# Patient Record
Sex: MALE | Race: WHITE | NOT HISPANIC OR LATINO | Employment: FULL TIME | URBAN - METROPOLITAN AREA
[De-identification: names, ages, dates, MRNs, and addresses within clinical notes are randomized per-mention and may not be internally consistent; named-entity substitution may affect disease eponyms.]

---

## 2017-08-01 ENCOUNTER — ALLSCRIPTS OFFICE VISIT (OUTPATIENT)
Dept: OTHER | Facility: OTHER | Age: 32
End: 2017-08-01

## 2018-01-12 VITALS
OXYGEN SATURATION: 96 % | DIASTOLIC BLOOD PRESSURE: 77 MMHG | RESPIRATION RATE: 16 BRPM | SYSTOLIC BLOOD PRESSURE: 126 MMHG | TEMPERATURE: 97.8 F | HEART RATE: 66 BPM

## 2020-01-09 VITALS
SYSTOLIC BLOOD PRESSURE: 119 MMHG | WEIGHT: 155 LBS | DIASTOLIC BLOOD PRESSURE: 78 MMHG | HEIGHT: 71 IN | BODY MASS INDEX: 21.7 KG/M2 | HEART RATE: 60 BPM

## 2020-01-09 DIAGNOSIS — M77.11 LATERAL EPICONDYLITIS OF RIGHT ELBOW: Primary | ICD-10-CM

## 2020-01-09 DIAGNOSIS — M25.521 PAIN IN RIGHT ELBOW: ICD-10-CM

## 2020-01-09 PROCEDURE — 99203 OFFICE O/P NEW LOW 30 MIN: CPT | Performed by: ORTHOPAEDIC SURGERY

## 2020-01-09 PROCEDURE — 20551 NJX 1 TENDON ORIGIN/INSJ: CPT | Performed by: ORTHOPAEDIC SURGERY

## 2020-01-09 RX ORDER — TRIAMCINOLONE ACETONIDE 40 MG/ML
20 INJECTION, SUSPENSION INTRA-ARTICULAR; INTRAMUSCULAR
Status: COMPLETED | OUTPATIENT
Start: 2020-01-09 | End: 2020-01-09

## 2020-01-09 RX ORDER — LIDOCAINE HYDROCHLORIDE 5 MG/ML
0.5 INJECTION, SOLUTION INFILTRATION; PERINEURAL
Status: COMPLETED | OUTPATIENT
Start: 2020-01-09 | End: 2020-01-09

## 2020-01-09 RX ADMIN — TRIAMCINOLONE ACETONIDE 20 MG: 40 INJECTION, SUSPENSION INTRA-ARTICULAR; INTRAMUSCULAR at 11:34

## 2020-01-09 RX ADMIN — LIDOCAINE HYDROCHLORIDE 0.5 ML: 5 INJECTION, SOLUTION INFILTRATION; PERINEURAL at 11:34

## 2020-01-09 NOTE — PROGRESS NOTES
Assessment/Plan:  1  Lateral epicondylitis of right elbow  Hand/upper extremity injection: R elbow   2  Pain in right elbow  XR elbow 3+ vw right       Scribe Attestation    I,:   Little Lebron MA am acting as a scribe while in the presence of the attending physician :        I,:   Eros Bourne DO personally performed the services described in this documentation    as scribed in my presence :              I discussed with Clairfadi Elza today that his signs and symptoms are consistent with lateral epicondylitis  He is tender to palpation over the lateral epicondyle  He does have pain with resisted wrist extension  Treatment options were discussed in the form of stretching, bracing and a steroid injection  He was agreeable to this  He was provided with a HEP for stretching  He was agreeable to this  He was fitted and provided with a counter force brace  Patient elected to proceed with the steroid injection and this was performed in the office today without any complications  He will follow up in 3 months for repeat evaluation  Subjective:   Avis Clemente is a 30 y o  male who presents to the office today for evaluation of right elbow pain  Patient states this has been ongoing for the past month and a half  He notes pain to the lateral aspect of his right elbow  He states this is increased with lifting  He has been using a compression sleeve and band with mild relief  He denies any known injury  He denies any numbness or tingling  Review of Systems   Constitutional: Negative for chills and fever  HENT: Negative for drooling and sneezing  Eyes: Negative for redness  Respiratory: Negative for cough and wheezing  Gastrointestinal: Negative for nausea and vomiting  Musculoskeletal: Negative for arthralgias, joint swelling and myalgias  Neurological: Negative for weakness and numbness  Psychiatric/Behavioral: Negative for behavioral problems  The patient is not nervous/anxious            History reviewed  No pertinent past medical history  History reviewed  No pertinent surgical history  Family History   Problem Relation Age of Onset    No Known Problems Mother     No Known Problems Father     No Known Problems Sister     No Known Problems Brother     No Known Problems Maternal Aunt     No Known Problems Maternal Uncle     No Known Problems Paternal Aunt     No Known Problems Paternal Uncle     No Known Problems Maternal Grandmother     No Known Problems Maternal Grandfather     No Known Problems Paternal Grandmother     No Known Problems Paternal Grandfather        Social History     Occupational History    Not on file   Tobacco Use    Smoking status: Never Smoker    Smokeless tobacco: Never Used   Substance and Sexual Activity    Alcohol use: Never     Frequency: Never    Drug use: Never    Sexual activity: Not on file       No current outpatient medications on file  No Known Allergies    Objective:  Vitals:    01/09/20 1121   BP: 119/78   Pulse: 60       Ortho Exam     Right elbow    TTP lateral epicondyle  Pain with resisted wrist extension  NTTP triceps  5/5 triceps   - tinel's radial nerve  Compartments soft  Brisk capillary refill  S/m median, radial, and ulnar nerve     Physical Exam   Constitutional: He is oriented to person, place, and time  He appears well-developed and well-nourished  HENT:   Head: Normocephalic and atraumatic  Eyes: Conjunctivae are normal  Right eye exhibits no discharge  Left eye exhibits no discharge  Neck: Normal range of motion  Neck supple  Cardiovascular: Normal rate and intact distal pulses  Pulmonary/Chest: Effort normal  No respiratory distress  Musculoskeletal:   As noted in HPI   Neurological: He is alert and oriented to person, place, and time  Skin: Skin is warm and dry  Psychiatric: He has a normal mood and affect   His behavior is normal  Judgment and thought content normal    Hand/upper extremity injection: R elbow  Date/Time: 1/9/2020 11:34 AM  Consent given by: patient  Site marked: site marked  Timeout: Immediately prior to procedure a time out was called to verify the correct patient, procedure, equipment, support staff and site/side marked as required   Supporting Documentation  Indications: pain   Procedure Details  Condition:lateral epicondylitis Site: R elbow   Preparation: Patient was prepped and draped in the usual sterile fashion  Needle size: 25 G  Ultrasound guidance: no  Medications administered: 0 5 mL lidocaine 0 5 %; 20 mg triamcinolone acetonide 40 mg/mL    Patient tolerance: patient tolerated the procedure well with no immediate complications  Dressing:  Sterile dressing applied

## 2021-06-01 ENCOUNTER — OFFICE VISIT (OUTPATIENT)
Dept: OBGYN CLINIC | Facility: CLINIC | Age: 36
End: 2021-06-01
Payer: COMMERCIAL

## 2021-06-01 VITALS
WEIGHT: 155 LBS | BODY MASS INDEX: 21.7 KG/M2 | SYSTOLIC BLOOD PRESSURE: 112 MMHG | DIASTOLIC BLOOD PRESSURE: 72 MMHG | HEIGHT: 71 IN | HEART RATE: 65 BPM

## 2021-06-01 DIAGNOSIS — M77.11 LATERAL EPICONDYLITIS OF RIGHT ELBOW: Primary | ICD-10-CM

## 2021-06-01 DIAGNOSIS — M25.521 PAIN IN RIGHT ELBOW: ICD-10-CM

## 2021-06-01 PROCEDURE — 99213 OFFICE O/P EST LOW 20 MIN: CPT | Performed by: ORTHOPAEDIC SURGERY

## 2021-06-01 PROCEDURE — 20551 NJX 1 TENDON ORIGIN/INSJ: CPT | Performed by: ORTHOPAEDIC SURGERY

## 2021-06-01 RX ORDER — FEXOFENADINE HYDROCHLORIDE 60 MG/1
60 TABLET, FILM COATED ORAL DAILY
COMMUNITY

## 2021-06-01 RX ORDER — TRIAMCINOLONE ACETONIDE 40 MG/ML
40 INJECTION, SUSPENSION INTRA-ARTICULAR; INTRAMUSCULAR
Status: COMPLETED | OUTPATIENT
Start: 2021-06-01 | End: 2021-06-01

## 2021-06-01 RX ORDER — LIDOCAINE HYDROCHLORIDE 10 MG/ML
1 INJECTION, SOLUTION INFILTRATION; PERINEURAL
Status: COMPLETED | OUTPATIENT
Start: 2021-06-01 | End: 2021-06-01

## 2021-06-01 RX ADMIN — TRIAMCINOLONE ACETONIDE 40 MG: 40 INJECTION, SUSPENSION INTRA-ARTICULAR; INTRAMUSCULAR at 08:10

## 2021-06-01 RX ADMIN — LIDOCAINE HYDROCHLORIDE 1 ML: 10 INJECTION, SOLUTION INFILTRATION; PERINEURAL at 08:10

## 2021-06-01 NOTE — PROGRESS NOTES
Assessment/Plan:  1  Lateral epicondylitis of right elbow     2  Pain in right elbow         Scribe Attestation    I,:  Berkley Mishra am acting as a scribe while in the presence of the attending physician :       I,:  Araceli Crespo DO personally performed the services described in this documentation    as scribed in my presence :         Gabby Lane is a pleasant 37year old who presents to the office today for a follow-up evaluation of his lateral epicondylitis of his right elbow  Since his previous corticosteroid steroid provided him over a year of relief, I offered him a repeat injection at today's visit  We discussed the risks and benefits of corticosteroid injection today in the office and he did elect to proceed  The right elbow injection was administered without issue and well tolerated by the patient  Post injection instructions were provided  He understands can be repeated no sooner than three months  I discussed with the patient that he should continue with the use of the counter-force brace and the physician directed home exercise program  He may use Advil and ice as needed to help alleviate his pain  I will follow-up with him as needed  He understood and had no further questions  Subjective:   Patient ID: Marilyn Gardner is a 37 y o  male who presents to the office today for a follow-up evaluation of his lateral epicondylitis of his right elbow  He received a corticosteroid injection in January 2020 for his lateral epicondylitis and reports it provided him with relief up until February 2021  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 and notes pain over his lateral elbow with any type of pulling  He states that he will experience a sharp shooting pain  He states that he has been complaint with the use of the counter-force brace and the physician directed home exercise program  He denies any numbness or tingling  He would like a repeat corticosteroid injection at today's visit      Review of Systems Constitutional: Negative for chills, fever and unexpected weight change  HENT: Negative for hearing loss, nosebleeds and sore throat  Eyes: Negative for pain, redness and visual disturbance  Respiratory: Negative for cough, shortness of breath and wheezing  Cardiovascular: Negative for chest pain, palpitations and leg swelling  Gastrointestinal: Negative for abdominal pain, nausea and vomiting  Endocrine: Negative for polyphagia and polyuria  Genitourinary: Negative for dysuria and hematuria  Musculoskeletal: Negative for arthralgias, gait problem and joint swelling  Skin: Negative for rash and wound  Neurological: Negative for dizziness, numbness and headaches  Psychiatric/Behavioral: Negative for decreased concentration  The patient is not nervous/anxious  History reviewed  No pertinent past medical history  History reviewed  No pertinent surgical history      Family History   Problem Relation Age of Onset    No Known Problems Mother     No Known Problems Father     No Known Problems Sister     No Known Problems Brother     No Known Problems Maternal Aunt     No Known Problems Maternal Uncle     No Known Problems Paternal Aunt     No Known Problems Paternal Uncle     No Known Problems Maternal Grandmother     No Known Problems Maternal Grandfather     No Known Problems Paternal Grandmother     No Known Problems Paternal Grandfather        Social History     Occupational History    Not on file   Tobacco Use    Smoking status: Never Smoker    Smokeless tobacco: Never Used   Substance and Sexual Activity    Alcohol use: Never     Frequency: Never    Drug use: Never    Sexual activity: Not on file         Current Outpatient Medications:     fexofenadine (ALLEGRA) 60 MG tablet, Take 60 mg by mouth daily, Disp: , Rfl:     No Known Allergies    Objective:  Vitals:    06/01/21 0751   BP: 112/72   Pulse: 65       Ortho Exam   Right Elbow  TTP lateral epicondyle  Pain with resisted wrist extension  Compartments soft  Brisk capillary refill  S/m median, radial, and ulnar nerve     Physical Exam  Constitutional:       Appearance: He is well-developed  HENT:      Head: Normocephalic and atraumatic  Eyes:      General:         Right eye: No discharge  Left eye: No discharge  Conjunctiva/sclera: Conjunctivae normal    Neck:      Musculoskeletal: Normal range of motion and neck supple  Cardiovascular:      Rate and Rhythm: Normal rate  Pulmonary:      Effort: Pulmonary effort is normal  No respiratory distress  Musculoskeletal:      Comments: As noted in HPI   Skin:     General: Skin is warm and dry  Neurological:      Mental Status: He is alert and oriented to person, place, and time  Psychiatric:         Behavior: Behavior normal          Thought Content: Thought content normal          Judgment: Judgment normal        Hand/upper extremity injection: R elbow  Universal Protocol:  Consent: Verbal consent obtained  Risks and benefits: risks, benefits and alternatives were discussed  Consent given by: patient  Site marked: the operative site was marked  Supporting Documentation  Indications: pain   Procedure Details  Condition:lateral epicondylitis Site: R elbow   Needle size: 25 G  Ultrasound guidance: no  Medications administered: 1 mL lidocaine 1 %; 40 mg triamcinolone acetonide 40 mg/mL    Patient tolerance: patient tolerated the procedure well with no immediate complications  Dressing:  Sterile dressing applied           I have personally reviewed pertinent films in PACS and my interpretation is as follows: no new images reviewed today

## 2022-02-22 ENCOUNTER — OFFICE VISIT (OUTPATIENT)
Dept: OBGYN CLINIC | Facility: CLINIC | Age: 37
End: 2022-02-22
Payer: COMMERCIAL

## 2022-02-22 VITALS
DIASTOLIC BLOOD PRESSURE: 82 MMHG | SYSTOLIC BLOOD PRESSURE: 125 MMHG | WEIGHT: 155 LBS | BODY MASS INDEX: 21.7 KG/M2 | HEIGHT: 71 IN | HEART RATE: 73 BPM

## 2022-02-22 DIAGNOSIS — M77.11 LATERAL EPICONDYLITIS OF RIGHT ELBOW: Primary | ICD-10-CM

## 2022-02-22 PROCEDURE — 99213 OFFICE O/P EST LOW 20 MIN: CPT | Performed by: ORTHOPAEDIC SURGERY

## 2022-02-22 PROCEDURE — 20551 NJX 1 TENDON ORIGIN/INSJ: CPT | Performed by: ORTHOPAEDIC SURGERY

## 2022-02-22 RX ORDER — DEXAMETHASONE SODIUM PHOSPHATE 100 MG/10ML
40 INJECTION INTRAMUSCULAR; INTRAVENOUS
Status: COMPLETED | OUTPATIENT
Start: 2022-02-22 | End: 2022-02-22

## 2022-02-22 RX ORDER — LIDOCAINE HYDROCHLORIDE 10 MG/ML
1 INJECTION, SOLUTION INFILTRATION; PERINEURAL
Status: COMPLETED | OUTPATIENT
Start: 2022-02-22 | End: 2022-02-22

## 2022-02-22 RX ADMIN — LIDOCAINE HYDROCHLORIDE 1 ML: 10 INJECTION, SOLUTION INFILTRATION; PERINEURAL at 15:45

## 2022-02-22 RX ADMIN — DEXAMETHASONE SODIUM PHOSPHATE 40 MG: 100 INJECTION INTRAMUSCULAR; INTRAVENOUS at 15:45

## 2022-02-22 NOTE — PROGRESS NOTES
Assessment/Plan:  1  Lateral epicondylitis of right elbow       31year old male with right lateral epicondylitis  Patient is interested in repeat injection today  The right lateral epicondyle was prepped in the usual fashion and steroid injection administered today  Patient tolerated the procedure well  He should continue with the home stretches and use his counterforce brace prn  Follow up PRN  At patient's next appointment, we will get xrays of the right elbow  Subjective:   Allyssa Rodriguez is a 31 y o  male who presents to the office today for follow up of right lateral epicondylitis  Patient has received 2 previous steroid injections for this with the last one being on 06/01/21  He states that this worked well until just about 3 weeks ago  He states the symptoms are similar to what they were prior to his last injection  He denies burning sensation or n/t  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  Review of Systems   All other systems reviewed and are negative  No past medical history on file  No past surgical history on file      Family History   Problem Relation Age of Onset    No Known Problems Mother     No Known Problems Father     No Known Problems Sister     No Known Problems Brother     No Known Problems Maternal Aunt     No Known Problems Maternal Uncle     No Known Problems Paternal Aunt     No Known Problems Paternal Uncle     No Known Problems Maternal Grandmother     No Known Problems Maternal Grandfather     No Known Problems Paternal Grandmother     No Known Problems Paternal Grandfather        Social History     Occupational History    Not on file   Tobacco Use    Smoking status: Never Smoker    Smokeless tobacco: Never Used   Substance and Sexual Activity    Alcohol use: Never    Drug use: Never    Sexual activity: Not on file         Current Outpatient Medications:     fexofenadine (ALLEGRA) 60 MG tablet, Take 60 mg by mouth daily, Disp: , Rfl:     No Known Allergies    Objective:  Vitals:    02/22/22 1506   BP: 125/82   Pulse: 73       Ortho Exam   Right Elbow:  TTP lateral epicondyle  FROM elbow  Pain with active resisted wrist extension  Nontender biceps/triceps, radiocapitellar joint, radial tunnel and medial epicondyle  SILT m/r/u nerve distributions  Brisk capillary refill  Physical Exam  Vitals reviewed  Constitutional:       General: He is not in acute distress  Appearance: Normal appearance  HENT:      Head: Normocephalic and atraumatic  Eyes:      Extraocular Movements: Extraocular movements intact  Conjunctiva/sclera: Conjunctivae normal    Cardiovascular:      Pulses: Normal pulses  Pulmonary:      Effort: Pulmonary effort is normal  No respiratory distress  Abdominal:      Tenderness: There is no guarding  Musculoskeletal:      Cervical back: No rigidity  Skin:     General: Skin is warm and dry  Neurological:      Mental Status: He is alert and oriented to person, place, and time  Psychiatric:         Mood and Affect: Mood normal          Behavior: Behavior normal          Thought Content: Thought content normal          Studies Reviewed:  I have personally reviewed pertinent films in PACS and my interpretation is as follows:  None today  Procedures Performed:  Hand/upper extremity injection: R elbow  Universal Protocol:  Consent: Verbal consent obtained    Risks and benefits: risks, benefits and alternatives were discussed  Consent given by: patient  Patient understanding: patient states understanding of the procedure being performed  Patient identity confirmed: verbally with patient    Supporting Documentation  Indications: pain   Procedure Details  Condition:lateral epicondylitis Site: R elbow   Needle size: 27 G  Ultrasound guidance: no  Approach: lateral  Medications administered: 1 mL lidocaine 1 %; 40 mg dexamethasone 100 mg/10 mL    Patient tolerance: patient tolerated the procedure well with no immediate complications  Dressing:  Sterile dressing applied

## 2023-03-17 ENCOUNTER — APPOINTMENT (OUTPATIENT)
Dept: RADIOLOGY | Facility: CLINIC | Age: 38
End: 2023-03-17

## 2023-03-17 ENCOUNTER — OFFICE VISIT (OUTPATIENT)
Dept: OBGYN CLINIC | Facility: CLINIC | Age: 38
End: 2023-03-17

## 2023-03-17 VITALS
SYSTOLIC BLOOD PRESSURE: 140 MMHG | TEMPERATURE: 98.2 F | BODY MASS INDEX: 24.13 KG/M2 | HEART RATE: 67 BPM | WEIGHT: 173 LBS | DIASTOLIC BLOOD PRESSURE: 70 MMHG

## 2023-03-17 DIAGNOSIS — M89.8X1 PERISCAPULAR PAIN: ICD-10-CM

## 2023-03-17 DIAGNOSIS — M25.511 RIGHT SHOULDER PAIN, UNSPECIFIED CHRONICITY: Primary | ICD-10-CM

## 2023-03-17 DIAGNOSIS — S46.811A TRAPEZIUS STRAIN, RIGHT, INITIAL ENCOUNTER: ICD-10-CM

## 2023-03-17 DIAGNOSIS — M25.511 RIGHT SHOULDER PAIN, UNSPECIFIED CHRONICITY: ICD-10-CM

## 2023-03-17 NOTE — PROGRESS NOTES
Assessment/Plan:  1  Right shoulder pain, unspecified chronicity  XR shoulder 2+ vw right    Ambulatory Referral to Physical Therapy      2  Trapezius strain, right, initial encounter        3  Periscapular pain          The patient does appear to have an upper trapezius and periscapular strain  I am reassured he has excellent strength and range of motion of the shoulder  I do not suspect any cervical radiculopathy at this point as the patient has no pain or numbness rating down the right upper extremity  We will start with physical therapy for this  We did discuss possible MRI in the future if he fails to make progress  He will notify us immediately if he gets any radicular pain or numbness down the right upper extremity  He may ice and take over-the-counter medications as needed for discomfort  He will follow-up in 6 weeks for repeat evaluation  Subjective:   Christina Blue is a 30 y o  male who presents today for evaluation of his right shoulder  He notes he started with pain last Wednesday, with no inciting event prior to the onset of his symptoms  He notes his most about the superior aspect of the shoulder and also occasionally about the posterior aspect in the region of his scapula  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  He notes he has had similar pain to this in the past, however this usually resolves after chiropractic treatment  He did see the chiropractor twice since this started with minimal improvements  He did also see another sports type provider earlier in the week provider earlier in the week Wednesday who had ordered an MRI  Unfortunately his insurance denied this  He is unsure if he ordered a neck or shoulder MRI  He notes his pain does not radiate past the shoulder  His pain seems to be worse with random things like simply sitting in a car for too long  It does not necessarily with movements of the shoulder    He notes good sensation of the right upper extremity, and again denies any pain radiating down the arm  Adali Lim He notes good range of motion of the shoulder neck, and good strength of the right upper extremity  Review of Systems   Constitutional: Negative  Negative for chills and fever  HENT: Negative  Negative for ear pain and sore throat  Eyes: Negative  Negative for pain and redness  Respiratory: Negative  Negative for shortness of breath and wheezing  Cardiovascular: Negative for chest pain and palpitations  Gastrointestinal: Negative  Negative for abdominal pain and blood in stool  Endocrine: Negative  Negative for polydipsia and polyuria  Genitourinary: Negative  Negative for difficulty urinating and dysuria  Musculoskeletal:        As noted in HPI   Skin: Negative  Negative for pallor and rash  Neurological: Negative  Negative for dizziness and numbness  Hematological: Negative  Negative for adenopathy  Does not bruise/bleed easily  Psychiatric/Behavioral: Negative  Negative for confusion and suicidal ideas  History reviewed  No pertinent past medical history  History reviewed  No pertinent surgical history      Family History   Problem Relation Age of Onset   • No Known Problems Mother    • No Known Problems Father    • No Known Problems Sister    • No Known Problems Brother    • No Known Problems Maternal Aunt    • No Known Problems Maternal Uncle    • No Known Problems Paternal Aunt    • No Known Problems Paternal Uncle    • No Known Problems Maternal Grandmother    • No Known Problems Maternal Grandfather    • No Known Problems Paternal Grandmother    • No Known Problems Paternal Grandfather        Social History     Occupational History   • Not on file   Tobacco Use   • Smoking status: Never   • Smokeless tobacco: Never   Substance and Sexual Activity   • Alcohol use: Never   • Drug use: Never   • Sexual activity: Not on file         Current Outpatient Medications:   •  fexofenadine (ALLEGRA) 60 MG tablet, Take 60 mg by mouth daily, Disp: , Rfl:     No Known Allergies    Objective:  Vitals:    03/17/23 1403   BP: 140/70   Pulse: 67   Temp: 98 2 °F (36 8 °C)       Back Exam     Tenderness   Back tenderness location: no cerivcal tenderness  Comments:  Normal neck ROM  Sensation intact bilateral upper extremities  Right Shoulder Exam     Tenderness   Right shoulder tenderness location: mild upper trapezius and periscapular tenderness  Range of Motion   External rotation: 90   Forward flexion: 180   Internal rotation 0 degrees: T10     Muscle Strength   Abduction: 5/5   Internal rotation: 5/5   External rotation: 5/5   Biceps: 5/5     Tests   Nicole test: negative  Impingement: negative  Drop arm: negative    Other   Erythema: absent  Sensation: normal  Pulse: present            Physical Exam  Constitutional:       General: He is not in acute distress  Appearance: He is well-developed  HENT:      Head: Normocephalic and atraumatic  Eyes:      General: No scleral icterus  Conjunctiva/sclera: Conjunctivae normal    Neck:      Vascular: No JVD  Cardiovascular:      Rate and Rhythm: Normal rate  Pulmonary:      Effort: Pulmonary effort is normal  No respiratory distress  Skin:     General: Skin is warm  Neurological:      Mental Status: He is alert and oriented to person, place, and time  Coordination: Coordination normal          I have personally reviewed pertinent films in PACS and my interpretation is as follows:  X-rays right shoulder: No acute osseous abnormality  This document was created using speech voice recognition software  Grammatical errors, random word insertions, pronoun errors, and incomplete sentences are an occasional consequence of this system due to software limitations, ambient noise, and hardware issues     Any formal questions or concerns about content, text, or information contained within the body of this dictation should be directly addressed to the provider for clarification

## 2023-03-21 ENCOUNTER — EVALUATION (OUTPATIENT)
Dept: PHYSICAL THERAPY | Facility: CLINIC | Age: 38
End: 2023-03-21

## 2023-03-21 DIAGNOSIS — M25.511 RIGHT SHOULDER PAIN, UNSPECIFIED CHRONICITY: Primary | ICD-10-CM

## 2023-03-21 NOTE — PROGRESS NOTES
PT Evaluation     Today's date: 3/21/2023  Patient name: Colleen Ravi  : 1985  MRN: [id number]  Referring provider: Daisy Uriostegui  Dx:   Encounter Diagnosis     ICD-10-CM    1  Right shoulder pain, unspecified chronicity  M25 511 Ambulatory Referral to Physical Therapy                     Assessment  Assessment details: Colleen Ravi is a 30 y o  male who presents to physical therapy with pain, decreased UE range of motion, decreased UE strength, decreased cervical range of motion , impaired function, decreased activity tolerance, poor posture and poor body mechanics  Patient's clinical presentation is consistent with their referring diagnosis of Right shoulder pain, unspecified chronicity  (primary encounter diagnosis)  Potential cervical involvement present from special testing  The pt presents with functional limitations of ADLs, recreational activities and work-related activities  Pt would benefit from physical therapy services to address these limitations and maximize function  Pt was instructed and educated on home exercise program today and demonstrates understanding     Impairments: abnormal muscle firing, abnormal muscle tone, abnormal or restricted ROM, abnormal movement, activity intolerance, impaired physical strength, lacks appropriate home exercise program, pain with function, scapular dyskinesis, poor posture  and poor body mechanics    Symptom irritability: moderateUnderstanding of Dx/Px/POC: good   Prognosis: good    Goals  Short Term Goals (4 weeks)  Pt will be independent in basic Home Exercise program   Pt will demonstrate improved postural awareness with no cueing required from PT  Pt will demonstrate improved thoracic and cervical ROM to 100%  Pt will be able to perform ADLs with pain no more than 2/10      Long Term Goals (6-8 weeks)  Pt will be independent in comprehensive HEP  Pt will demonstrate improved FOTO status score by 10 points  Pt will be able to perform work duties with pain no more than 2/10  Pt will demonstrate improved shoulder MMT strength to 5/5            Plan  Patient would benefit from: skilled PT  Referral necessary: No  Planned modality interventions: cryotherapy and thermotherapy: hydrocollator packs  Planned therapy interventions: ADL retraining, body mechanics training, functional ROM exercises, home exercise program, graded exercise, joint mobilization, manual therapy, massage, neuromuscular re-education, patient education, postural training, strengthening, stretching, therapeutic activities, therapeutic exercise and therapeutic training  Frequency: 2x week  Duration in weeks: 6  Treatment plan discussed with: patient        Subjective Evaluation    History of Present Illness  Mechanism of injury: Pt reports right sided upper trap and neck pain that started about 2 weeks ago after helping to wire a house  States he has had several incidences of pain of this nature and has seeked chiropractic care which helped  Reports he did not get significant relief this time around  Had a consult with orthopedics and Xrays of shoulder were negative  He was referred to PT  Pt reports pain is constant in nature at this time  Feels best first thing in the morning and pain progresses with the day  Has been resting as much as possible  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  Denies any headaches or radicular symptoms at this time               Recurrent probem    Pain  Current pain ratin  At best pain ratin  At worst pain ratin  Location: right UT and right sided neck  Quality: burning and pulling  Relieving factors: relaxation  Aggravating factors: overhead activity and lifting  Progression: no change    Social Support    Employment status: working (septic)  Hand dominance: right  Exercise history: none      Diagnostic Tests  X-ray: normal  Treatments  Previous treatment: chiropractic  Patient Goals  Patient goals for therapy: decreased pain, independence with ADLs/IADLs and return to work          Objective     Postural Observations  Seated posture: poor  Standing posture: poor  Correction of posture: makes symptoms better    Additional Postural Observation Details  Forward head, rounded shoulders     Palpation     Right   Hypertonic in the levator scapulae, pectoralis minor, sternocleidomastoid, suboccipitals and upper trapezius  Tenderness of the levator scapulae, pectoralis minor, suboccipitals and upper trapezius  Cervical/Thoracic Screen   Thoracic range of motion within normal limits with the following exceptions: Into extension and rotation limited 50% bilaterally    Neurological Testing     Sensation     Shoulder   Left Shoulder   Intact: light touch    Right Shoulder   Intact: light touch    Active Range of Motion   Cervical/Thoracic Spine       Cervical    Subcranial retraction:   Restriction level: moderate  Extension:  with pain Restriction level: minimal  Left rotation:  with pain Restriction level: minimal  Right rotation:  with pain Restriction level: minimal    Thoracic    Left rotation:  Restriction level: moderate  Right rotation:  Restriction level: moderate  Left Shoulder   Normal active range of motion  Flexion: 180 degrees   Abduction: 170 degrees     Right Shoulder   Normal active range of motion  Flexion: 180 degrees with pain  Abduction: 170 degrees with pain    Scapular Mobility     Right Shoulder   Scapular mobility: poor    Scapular Mobility beyond 90° FF   Scapular elevation: moderate  Upward rotation: inadequate    Joint Play   Left Shoulder  Hypomobile in the thoracic spine  Right Shoulder  Hypomobile in the thoracic spine       Strength/Myotome Testing     Left Shoulder     Planes of Motion   Flexion: 5   Extension: 5   Abduction: 5   External rotation at 0°: 5   Internal rotation at 0°: 5     Right Shoulder     Planes of Motion   Flexion: 4   Extension: 4   Abduction: 4   External rotation at 0°: 4+   Internal rotation at 0°: 5     Additional Strength Details  Pain with resisted right abduction at UT    Tests   Cervical   Positive vertical compression and cervical distraction test     Right Shoulder   Negative drop arm, empty can and Hawkin's  Lumbar   Positive vertical compression   Flowsheet Rows    Flowsheet Row Most Recent Value   PT/OT G-Codes    Current Score 65   Projected Score 79   FOTO information reviewed Yes          Pt was given initial Home Exercise Program today and demonstrates understanding   [employer] access code: [email or web address]       Precautions standard       Manuals 3/21                                       Neuro Re-Ed         Chin tuck x10       scap squeeze x10                                               Ther Ex        TS rotation x10 ea       TS ext        Hor abd        Prone T        Prone Y                                Ther Activity                        Gait Training                        Modalities

## 2023-03-23 ENCOUNTER — OFFICE VISIT (OUTPATIENT)
Dept: PHYSICAL THERAPY | Facility: CLINIC | Age: 38
End: 2023-03-23

## 2023-03-23 DIAGNOSIS — M25.511 RIGHT SHOULDER PAIN, UNSPECIFIED CHRONICITY: Primary | ICD-10-CM

## 2023-03-23 NOTE — PROGRESS NOTES
Daily Note     Today's date: 3/23/2023  Patient name: Rock Ray  : 1985  MRN: [id number]  Referring provider: Chapo Flores  Dx:   Encounter Diagnosis   Name Primary? • Right shoulder pain, unspecified chronicity Yes                  Subjective: Pt reports 3-4/10 pain in right neck and shoulder  Objective: See treatment diary below      Assessment: Pt tolerated treatment well with mininal complaints of pain  Pt with significant TTP at right subocciptals, UT and lev scap  Presents with rounded shoulders and pec tightness  Reviewed initial HEP with patient and demonstrates independence  Plan: Continue with plan of care to decrease pain, improve mobility, strength, and function            [employer] access code: [email or web address]    Precautions standard       Manuals 3/21 3/23      STM  Right subocc, lev scap, UT release      Mobilization   CS distraction                      Neuro Re-Ed         Chin tuck x10 Hold 5, 2x10       scap squeeze x10 Hold 5, 2x10 supine                                              Ther Ex        TS rotation x10 ea Hold 5, 2x10 right      TS ext        Hor abd        Prone T        Prone Y        Corner stretch  Hold 10, 1x10                       Ther Activity                        Gait Training                        Modalities

## 2023-03-28 ENCOUNTER — OFFICE VISIT (OUTPATIENT)
Dept: PHYSICAL THERAPY | Facility: CLINIC | Age: 38
End: 2023-03-28

## 2023-03-28 DIAGNOSIS — M25.511 RIGHT SHOULDER PAIN, UNSPECIFIED CHRONICITY: Primary | ICD-10-CM

## 2023-03-28 NOTE — PROGRESS NOTES
Daily Note     Today's date: 3/28/2023  Patient name: Olive Ellsworth  : 1985  MRN: [id number]  Referring provider: Carlos Fregoso  Dx:   Encounter Diagnosis   Name Primary? • Right shoulder pain, unspecified chronicity Yes                  Subjective: Pt reports improved symptoms after last session  Pain currently rated 3/10  Objective: See treatment diary below      Assessment: Pt tolerated treatment well with minimal complaints of pain  Pt progressing with thoracic rotation mobility  Significant TTP persists at right UT and lev scap  Pt was instructed on self muscular releases using a tennis ball and demonstrates understanding  Plan: Continue with plan of care to decrease pain, improve mobility, strength, and function            [employer] access code: [email or web address]    Precautions standard       Manuals 3/21 3/23 3/28     STM  Right subocc, lev scap, UT release Right subocc, lev scap, UT release     Mobilization   CS distraction CS distraction                     Neuro Re-Ed         Chin tuck x10 Hold 5, 2x10  Hold 5, 2x10      scap squeeze x10 Hold 5, 2x10 supine Hold 5, 2x10 supine                                             Ther Ex        TS rotation x10 ea Hold 5, 2x10 right Hold 5, 2x10 right     TS ext        Hor abd        Prone T        Prone Y        Corner stretch  Hold 10, 1x10  Hold 10, 1x10                      Ther Activity                        Gait Training                        Modalities

## 2023-03-30 ENCOUNTER — OFFICE VISIT (OUTPATIENT)
Dept: PHYSICAL THERAPY | Facility: CLINIC | Age: 38
End: 2023-03-30

## 2023-03-30 DIAGNOSIS — M54.12 CERVICAL RADICULOPATHY: ICD-10-CM

## 2023-03-30 DIAGNOSIS — M25.511 RIGHT SHOULDER PAIN, UNSPECIFIED CHRONICITY: Primary | ICD-10-CM

## 2023-03-30 NOTE — PROGRESS NOTES
Daily Note     Today's date: 3/30/2023  Patient name: Ifeanyi Michael  : 1985  MRN: [id number]  Referring provider: Osmin Humphrey  Dx:   Encounter Diagnosis     ICD-10-CM    1  Right shoulder pain, unspecified chronicity  M25 511                      Subjective: Ifeanyi Michael reports he has not been experiencing as much soreness into shoulder but continues with pain in right side of neck and periscapular region intermittently  Objective: See treatment diary below      Assessment: Tolerated treatment well  Patient responded extremely well to repeated extension based movements with improved cervical rotation, sidebending and extension ROM with pain levels almost abolished by end of session  He demos significant difficulty coordinating rectration but demod improvement when performing against wall with pt OP  He also required cues to achieve more upright posture  Plan: Continue per plan of care        [facility] access code: [email or web address]    Precautions standard       Manuals 3/21 3/23 3/28 3/30    STM  Right subocc, lev scap, UT release Right subocc, lev scap, UT release     Mobilization   CS distraction CS distraction                     Neuro Re-Ed         Chin tuck x10 Hold 5, 2x10  Hold 5, 2x10  10x seated +10pt OP    Chin tuck at wall w/pt OP    3x10 improved CS ROM and decreased pain    scap squeeze x10 Hold 5, 2x10 supine Hold 5, 2x10 supine     Supine retraction w/pt OP    2x10 improved ROM and pain                                    Ther Ex        TS rotation x10 ea Hold 5, 2x10 right Hold 5, 2x10 right     TS ext        Hor abd        Prone T        Prone Y        Corner stretch  Hold 10, 1x10  Hold 10, 1x10      CS retraction w/ extension    3x10 improved ROM and abolished pain

## 2023-04-04 ENCOUNTER — OFFICE VISIT (OUTPATIENT)
Dept: PHYSICAL THERAPY | Facility: CLINIC | Age: 38
End: 2023-04-04

## 2023-04-04 DIAGNOSIS — M54.12 CERVICAL RADICULOPATHY: ICD-10-CM

## 2023-04-04 DIAGNOSIS — M25.511 RIGHT SHOULDER PAIN, UNSPECIFIED CHRONICITY: Primary | ICD-10-CM

## 2023-04-04 NOTE — PROGRESS NOTES
Daily Note     Today's date: 2023  Patient name: Argelia Kapadia  : 1985  MRN: [id number]  Referring provider: Thomas Rothman  Dx:   Encounter Diagnosis     ICD-10-CM    1  Right shoulder pain, unspecified chronicity  M25 511       2  Cervical radiculopathy  M54 12           Start Time: 1315  Stop Time: 1355  Total time in clinic (min): 40 minutes    Subjective: Pt reports that his neck and R shoulder are doing much better since his last treatment session  Pt states that after he does his chin tucks and extensions he regains all of his cervical ROM and his shoulder pain is abolished  No new complaints  Objective: See treatment diary below      Assessment: Tolerated treatment well  Patient demonstrated fatigue post treatment, exhibited good technique with therapeutic exercises and would benefit from continued PT  Pt continues to respond extremely well to the repeated cervical retractions with extension to abolish all pain and normalize his cervical ROM  Continued with that treatment in addition to shoulder/postural strengthening with good head position  Plan: Continue per plan of care  Progress treatment as tolerated         [employer] access code: [email or web address]    Precautions standard       Manuals 3/21 3/23 3/28 3/30 4   STM  Right subocc, lev scap, UT release Right subocc, lev scap, UT release     Mobilization   CS distraction CS distraction                     Neuro Re-Ed         Chin tuck x10 Hold 5, 2x10  Hold 5, 2x10  10x seated +10pt OP 2x10 w/pt OP, better   Chin tuck at wall w/pt OP    3x10 improved CS ROM and decreased pain    scap squeeze x10 Hold 5, 2x10 supine Hold 5, 2x10 supine  Rows blue w/chin tuck 2x10   Supine retraction w/pt OP    2x10 improved ROM and pain    D2 at wall     W/chin tuck 2x10 B                           Ther Ex        TS rotation x10 ea Hold 5, 2x10 right Hold 5, 2x10 right     TS ext        Hor abd        Prone T        Prone Y        Corner stretch Hold 10, 1x10  Hold 10, 1x10      CS retraction w/ extension    3x10 improved ROM and abolished pain 2x10 decreased and abolished

## 2023-04-06 ENCOUNTER — OFFICE VISIT (OUTPATIENT)
Dept: PHYSICAL THERAPY | Facility: CLINIC | Age: 38
End: 2023-04-06

## 2023-04-06 DIAGNOSIS — M54.12 CERVICAL RADICULOPATHY: ICD-10-CM

## 2023-04-06 DIAGNOSIS — M25.511 RIGHT SHOULDER PAIN, UNSPECIFIED CHRONICITY: Primary | ICD-10-CM

## 2023-04-06 NOTE — PROGRESS NOTES
Daily Note     Today's date: 2023  Patient name: Yenni Olivas  : 1985  MRN: [id number]  Referring provider: Arnaud Vera  Dx:   Encounter Diagnosis     ICD-10-CM    1  Right shoulder pain, unspecified chronicity  M25 511       2  Cervical radiculopathy  M54 12           Start Time: 2575  Stop Time: 1615  Total time in clinic (min): 45 minutes    Subjective: Pt reports symptoms have continued to be minimal with c/s retraction and extension  Currently <1 in R post shoulder  Objective: See treatment diary below      Assessment: Pt demonstrates scapular hiking with sh ER when fatigues, verbal cue for rest and posture reset improved form  Pt notes ttp remains along R c/s and post shoulder, pain decreased post treatment session < 5      Plan: Continue per plan of care  Progress treatment as tolerated         [employer] access code: [email or web address]    Precautions standard       Manuals 4/7 3/23 3/28 3/30 4/4   STM Right subocc, lev scap, UT release Right subocc, lev scap, UT release Right subocc, lev scap, UT release     Mobilization  CS distraction CS distraction CS distraction                     Neuro Re-Ed         Chin tuck x10 Hold 5, 2x10  Hold 5, 2x10  10x seated +10pt OP 2x10 w/pt OP, better   Chin tuck at wall w/pt OP    3x10 improved CS ROM and decreased pain    scap squeeze Rows blue w/chin tuck 2x10 Hold 5, 2x10 supine Hold 5, 2x10 supine  Rows blue w/chin tuck 2x10   Supine retraction w/pt OP    2x10 improved ROM and pain    D2 at wall     W/chin tuck 2x10 B                           Ther Ex        TS rotation  Hold 5, 2x10 right Hold 5, 2x10 right     Sh Ext  W/ chin tuck 2x10       Hor abd        Prone T        Prone Y        Corner stretch  Hold 10, 1x10  Hold 10, 1x10      CS retraction w/ extension 2x10   3x10 improved ROM and abolished pain 2x10 decreased and abolished   Shoulder ER Green 2x10 w/ chin tuck

## 2023-04-19 DIAGNOSIS — M25.511 RIGHT SHOULDER PAIN, UNSPECIFIED CHRONICITY: Primary | ICD-10-CM

## 2023-04-24 ENCOUNTER — EVALUATION (OUTPATIENT)
Dept: PHYSICAL THERAPY | Facility: CLINIC | Age: 38
End: 2023-04-24

## 2023-04-24 DIAGNOSIS — M54.12 CERVICAL RADICULOPATHY: ICD-10-CM

## 2023-04-24 DIAGNOSIS — M25.511 RIGHT SHOULDER PAIN, UNSPECIFIED CHRONICITY: Primary | ICD-10-CM

## 2023-04-24 NOTE — PROGRESS NOTES
PT Re-Evaluation     Today's date: 2023  Patient name: Yenni Olivas  : 1985  MRN: [id number]  Referring provider: Arnaud Vera  Dx:   Encounter Diagnosis     ICD-10-CM    1  Right shoulder pain, unspecified chronicity  M25 511       2  Cervical radiculopathy  M54 12           Start Time: 1400  Stop Time: 1428  Total time in clinic (min): 28 minutes    Assessment  Assessment details: Yenni Olivas is a 30 y o  male who presents to physical therapy for his 10th visit for R shoulder pain and cervical radiculopathy  Pt demonstrated decreased pain, increased UE range of motion, increased UE strength, increased cervical range of motion , impaired but improving function, improved activity tolerance, and improved posture and body mechanics  Since starting PT, Radha Montgomery has seen great improvements with his symptoms and feels better performing work duties and ADLs with significantly decreased pain  Radhavivian Montgomery has a better understanding of the source of his issues and does a great job of abolishing his symptoms if he starts to feel it by consistently performing his HEP and improving his posture  Pt feels he has improved enough to continue on his own, pt was educated on what he should focus on and was given an updated HEP  Pt is being discharged at this time     Impairments: abnormal muscle tone    Symptom irritability: moderateUnderstanding of Dx/Px/POC: good   Prognosis: good    Goals  Short Term Goals (4 weeks)  Pt will be independent in basic Home Exercise program - Met  Pt will demonstrate improved postural awareness with no cueing required from PT- Met  Pt will demonstrate improved thoracic and cervical ROM to 100%- Met  Pt will be able to perform ADLs with pain no more than 2/10- Met      Long Term Goals (6-8 weeks)  Pt will be independent in comprehensive HEP- Met  Pt will demonstrate improved FOTO status score by 10 points- Met  Pt will be able to perform work duties with pain no more than 2/10- Met  Pt will demonstrate improved shoulder MMT strength to 5/5- Met            Plan  Plan details: Discharge to updated [facility]  Patient would benefit from: skilled PT  Referral necessary: No  Planned modality interventions: cryotherapy and thermotherapy: hydrocollator packs  Planned therapy interventions: ADL retraining, body mechanics training, functional ROM exercises, home exercise program, graded exercise, joint mobilization, manual therapy, massage, neuromuscular re-education, patient education, postural training, strengthening, stretching, therapeutic activities, therapeutic exercise and therapeutic training  Treatment plan discussed with: patient        Subjective Evaluation    History of Present Illness  Mechanism of injury: 23 Update:  Pt states that he is feeling really good, his ROM has improved significantly  The pain he had in his neck is essentially gone, he might get a tight spot but it goes away after he does his stretches  Pt has not had pain in over a week  Pt is back to doing everything at work without aggravating any of his symptoms  Pt changed what pillows he is using which seems to have helped  Pt is lifting without restrictions  Pt continues to deny headaches, radicular symptoms              Recurrent probem    Pain  Current pain ratin  At best pain ratin  At worst pain ratin  Location: right UT and right scapula  Quality: tight  Relieving factors: relaxation  Progression: improved    Social Support    Employment status: working (septic)  Hand dominance: right  Exercise history: none      Diagnostic Tests  X-ray: normal  Treatments  Previous treatment: chiropractic  Patient Goals  Patient goals for therapy: decreased pain, independence with ADLs/IADLs and return to work          Objective     Postural Observations  Seated posture: fair  Standing posture: fair  Correction of posture: makes symptoms better    Additional Postural Observation Details  Forward head, rounded shoulders Palpation     Right   Hypertonic in the levator scapulae, pectoralis minor, sternocleidomastoid, suboccipitals and upper trapezius  Neurological Testing     Sensation     Shoulder   Left Shoulder   Intact: light touch    Right Shoulder   Intact: light touch    Active Range of Motion   Cervical/Thoracic Spine       Cervical  Subcranial protraction:  WFL   Subcranial retraction:   Restriction level: moderate  Flexion:  WFL  Extension:  WFL  Left lateral flexion:  WFL  Right lateral flexion:  WFL  Left rotation:  WFL  Right rotation:  WFL  Left Shoulder   Normal active range of motion  Flexion: 180 degrees   Abduction: 180 degrees     Right Shoulder   Normal active range of motion  Flexion: 180 degrees   Abduction: 180 degrees     Scapular Mobility     Right Shoulder   Scapular mobility: fair    Scapular Mobility beyond 90° FF   Scapular elevation: minimal    Joint Play   Left Shoulder  Hypomobile in the thoracic spine  Right Shoulder  Hypomobile in the thoracic spine  Strength/Myotome Testing     Left Shoulder     Planes of Motion   Flexion: 5   Extension: 5   Abduction: 5   External rotation at 0°: 5   Internal rotation at 0°: 5     Right Shoulder     Planes of Motion   Flexion: 5   Extension: 5   Abduction: 5   External rotation at 0°: 5   Internal rotation at 0°: 5     Tests   Cervical   Negative vertical compression and cervical distraction  Right Shoulder   Negative drop arm, empty can and Hawkin's  Lumbar   Negative vertical compression                [employer] access code: [email or web address]    Precautions standard       Manuals 4/7 4/11 4/13 4/17 4/24   STM Right subocc, lev scap, UT release       Mobilization  CS distraction                       Neuro Re-Ed         Chin tuck x10       Chin tuck at wall w/pt OP        scap squeeze Rows blue w/chin tuck 2x10       Supine retraction w/pt OP        D2 at wall        Seated Gh flexion w/foam roll behind  3x10 3x10     Supine GH diagonals   15x B GTB Supine [facility] horiz abd   2x10 B GTB     D2 at wall    W/foam roller 2x10 B     Ther Ex        TS rotation        Sh Ext  W/ chin tuck 2x10       Hor abd        Prone T        Prone Y        Corner stretch  4x30s 3x30s     CS retraction w/ extension 2x10 2x15 reduced pain 2x10 abolished     Shoulder ER Green 2x10 w/ chin tuck       TS ext over foam roll  2x10 2x10     Supine chin tuck w/ext  10x abolished pain 10x

## 2023-04-26 ENCOUNTER — OFFICE VISIT (OUTPATIENT)
Dept: OBGYN CLINIC | Facility: CLINIC | Age: 38
End: 2023-04-26

## 2023-04-26 VITALS
HEIGHT: 71 IN | TEMPERATURE: 97.7 F | BODY MASS INDEX: 24.36 KG/M2 | HEART RATE: 53 BPM | SYSTOLIC BLOOD PRESSURE: 127 MMHG | WEIGHT: 174 LBS | DIASTOLIC BLOOD PRESSURE: 85 MMHG

## 2023-04-26 DIAGNOSIS — S46.811A TRAPEZIUS STRAIN, RIGHT, INITIAL ENCOUNTER: Primary | ICD-10-CM

## 2023-04-26 DIAGNOSIS — M25.511 RIGHT SHOULDER PAIN, UNSPECIFIED CHRONICITY: ICD-10-CM

## 2023-04-26 DIAGNOSIS — M89.8X1 PERISCAPULAR PAIN: ICD-10-CM

## 2023-04-26 NOTE — PROGRESS NOTES
Assessment/Plan:  1  Trapezius strain, right, initial encounter        2  Periscapular pain        3  Right shoulder pain, unspecified chronicity            The patient is doing well and will transition to home exercises at this point  I did review his physical therapy notes today which demonstrated excellent progress with range of motion, strength, and pain  He can continue all activities as tolerated  He will follow-up as needed  Subjective:   Katey Murphy is a 30 y o  male who presents today for follow-up of his right shoulder  The patient has been doing physical therapy and notes significant improvements with this  He denies any pain about the shoulder or neck  He notes full range of motion of the shoulder  He denies any radiation of pain down the right upper extremity or any paresthesias of the right upper extremity  He is back to all his activities without any difficulty or pain  He notes good strength about to the right upper extremity  Review of Systems   Constitutional: Negative  Negative for chills and fever  HENT: Negative  Negative for ear pain and sore throat  Eyes: Negative  Negative for pain and redness  Respiratory: Negative  Negative for shortness of breath and wheezing  Cardiovascular: Negative for chest pain and palpitations  Gastrointestinal: Negative  Negative for abdominal pain and blood in stool  Endocrine: Negative  Negative for polydipsia and polyuria  Genitourinary: Negative  Negative for difficulty urinating and dysuria  Musculoskeletal:        As noted in HPI   Skin: Negative  Negative for pallor and rash  Neurological: Negative  Negative for dizziness and numbness  Hematological: Negative  Negative for adenopathy  Does not bruise/bleed easily  Psychiatric/Behavioral: Negative  Negative for confusion and suicidal ideas  History reviewed  No pertinent past medical history  History reviewed   No pertinent surgical history  Family History   Problem Relation Age of Onset   • No Known Problems Mother    • No Known Problems Father    • No Known Problems Sister    • No Known Problems Brother    • No Known Problems Maternal Aunt    • No Known Problems Maternal Uncle    • No Known Problems Paternal Aunt    • No Known Problems Paternal Uncle    • No Known Problems Maternal Grandmother    • No Known Problems Maternal Grandfather    • No Known Problems Paternal Grandmother    • No Known Problems Paternal Grandfather        Social History     Occupational History   • Not on file   Tobacco Use   • Smoking status: Never   • Smokeless tobacco: Never   Substance and Sexual Activity   • Alcohol use: Never   • Drug use: Never   • Sexual activity: Not on file         Current Outpatient Medications:   •  fexofenadine (ALLEGRA) 60 MG tablet, Take 60 mg by mouth daily, Disp: , Rfl:     No Known Allergies    Objective:  Vitals:    04/26/23 0832   BP: 127/85   Pulse: (!) 53   Temp: 97 7 °F (36 5 °C)       Right Shoulder Exam     Tenderness   The patient is experiencing no tenderness  Range of Motion   External rotation: 90   Forward flexion: 180   Internal rotation 0 degrees: T11     Muscle Strength   Abduction: 5/5   Internal rotation: 5/5   External rotation: 5/5     Tests   Drop arm: negative    Other   Erythema: absent  Sensation: normal  Pulse: present    Comments:  Negative empty can test              Physical Exam  Constitutional:       General: He is not in acute distress  Appearance: He is well-developed  HENT:      Head: Normocephalic and atraumatic  Eyes:      General: No scleral icterus  Conjunctiva/sclera: Conjunctivae normal    Neck:      Vascular: No JVD  Cardiovascular:      Rate and Rhythm: Normal rate  Pulmonary:      Effort: Pulmonary effort is normal  No respiratory distress  Skin:     General: Skin is warm  Neurological:      Mental Status: He is alert and oriented to person, place, and time  Coordination: Coordination normal                This document was created using speech voice recognition software  Grammatical errors, random word insertions, pronoun errors, and incomplete sentences are an occasional consequence of this system due to software limitations, ambient noise, and hardware issues  Any formal questions or concerns about content, text, or information contained within the body of this dictation should be directly addressed to the provider for clarification

## 2024-02-02 ENCOUNTER — OFFICE VISIT (OUTPATIENT)
Dept: FAMILY MEDICINE CLINIC | Facility: CLINIC | Age: 39
End: 2024-02-02
Payer: COMMERCIAL

## 2024-02-02 VITALS
DIASTOLIC BLOOD PRESSURE: 90 MMHG | SYSTOLIC BLOOD PRESSURE: 140 MMHG | BODY MASS INDEX: 24.36 KG/M2 | WEIGHT: 174 LBS | HEIGHT: 71 IN

## 2024-02-02 DIAGNOSIS — R03.0 ELEVATED BLOOD PRESSURE READING: ICD-10-CM

## 2024-02-02 DIAGNOSIS — Z13.220 SCREENING FOR HYPERLIPIDEMIA: ICD-10-CM

## 2024-02-02 DIAGNOSIS — Z13.228 SCREENING FOR METABOLIC DISORDER: ICD-10-CM

## 2024-02-02 DIAGNOSIS — Z76.89 ENCOUNTER TO ESTABLISH CARE: ICD-10-CM

## 2024-02-02 DIAGNOSIS — J01.40 ACUTE NON-RECURRENT PANSINUSITIS: Primary | ICD-10-CM

## 2024-02-02 DIAGNOSIS — Z13.29 SCREENING FOR THYROID DISORDER: ICD-10-CM

## 2024-02-02 PROCEDURE — 99204 OFFICE O/P NEW MOD 45 MIN: CPT | Performed by: STUDENT IN AN ORGANIZED HEALTH CARE EDUCATION/TRAINING PROGRAM

## 2024-02-02 RX ORDER — AMOXICILLIN AND CLAVULANATE POTASSIUM 875; 125 MG/1; MG/1
1 TABLET, FILM COATED ORAL EVERY 12 HOURS SCHEDULED
Qty: 14 TABLET | Refills: 0 | Status: SHIPPED | OUTPATIENT
Start: 2024-02-02 | End: 2024-02-09

## 2024-02-02 NOTE — PROGRESS NOTES
Name: Konstantin Niño      : 1985      MRN: [id number]  Encounter Provider: Breanna Negro MD  Encounter Date: 2024   Encounter department: [facility]    Assessment & Plan     1. Acute non-recurrent pansinusitis  -     amoxicillin-clavulanate (AUGMENTIN) 875-125 mg per tablet; Take 1 tablet by mouth every 12 (twelve) hours for 7 days  -     Comprehensive metabolic panel; Future; Expected date: 2024  -     CBC; Future  -     Comprehensive metabolic panel  -     CBC    2. Screening for hyperlipidemia  -     Lipid Panel with Direct LDL reflex; Future; Expected date: 2024  -     Lipid Panel with Direct LDL reflex    3. Screening for metabolic disorder  -     Hemoglobin A1C; Future  -     Hemoglobin A1C    4. Screening for thyroid disorder  -     TSH, 3rd generation; Future; Expected date: 2024  -     TSH, 3rd generation    5. Encounter to establish care    6. Elevated blood pressure reading      Depression Screening and Follow-up Plan: Patient was screened for depression during today's encounter. They screened negative with a PHQ-2 score of 0.      -Patient advised to monitor BP, will recheck during physical   Increase fluid intake as tolerated  Antibiotic for full course  Flonase OTC 1-2 sprays each nostril daily PRN for postnasal drip  RTO in 1 week if symptoms persist or worsen    Subjective      HPI    Patient reports nasal congestion, sinus pressure and postnasal drip which started six days ago. Notes he is also here to establish care. He has been taking mucinex and flonase for his symptoms. Denies cough, fever, chills and body aches. He is going on a fishing trip to [facility] soon.          Review of Systems   Constitutional:  Negative for activity change, appetite change, chills, fatigue and fever.   HENT:  Positive for congestion, postnasal drip and sinus pressure. Negative for rhinorrhea, sinus pain and sore throat.    Respiratory:  Negative for cough,  "shortness of breath and wheezing.    Cardiovascular:  Negative for chest pain, palpitations and leg swelling.   Gastrointestinal:  Negative for abdominal pain, constipation, diarrhea, nausea and vomiting.   Musculoskeletal:  Negative for myalgias.   Skin:  Negative for rash.   Neurological:  Negative for weakness, light-headedness and headaches.   Psychiatric/Behavioral:  The patient is not nervous/anxious.        Current Outpatient Medications on File Prior to Visit   Medication Sig   • fexofenadine (ALLEGRA) 60 MG tablet Take 60 mg by mouth daily       Objective     /90 (BP Location: Left arm, Patient Position: Sitting, Cuff Size: Standard)   Ht 5' 11\" (1.803 m)   Wt 78.9 kg (174 lb)   BMI 24.27 kg/m²     Physical Exam  Constitutional:       Appearance: Normal appearance.   HENT:      Head: Normocephalic and atraumatic.      Right Ear: Tympanic membrane, ear canal and external ear normal.      Left Ear: Tympanic membrane, ear canal and external ear normal.      Nose: Congestion present.      Mouth/Throat:      Pharynx: Posterior oropharyngeal erythema present.   Cardiovascular:      Rate and Rhythm: Normal rate and regular rhythm.      Pulses: Normal pulses.      Heart sounds: Normal heart sounds.   Pulmonary:      Effort: Pulmonary effort is normal.      Breath sounds: Normal breath sounds.   Neurological:      General: No focal deficit present.      Mental Status: He is alert and oriented to person, place, and time.   Psychiatric:         Mood and Affect: Mood normal.         Behavior: Behavior normal.         Thought Content: Thought content normal.         Judgment: Judgment normal.       Breanna Negro MD    "

## 2024-02-03 ENCOUNTER — APPOINTMENT (OUTPATIENT)
Dept: LAB | Facility: HOSPITAL | Age: 39
End: 2024-02-03
Payer: COMMERCIAL

## 2024-02-03 DIAGNOSIS — Z13.228 SCREENING FOR PHENYLKETONURIA (PKU): ICD-10-CM

## 2024-02-03 DIAGNOSIS — J01.40 ACUTE PANSINUSITIS, RECURRENCE NOT SPECIFIED: ICD-10-CM

## 2024-02-03 DIAGNOSIS — Z13.29 SCREENING FOR THYROID DISORDER: ICD-10-CM

## 2024-02-03 DIAGNOSIS — Z13.220 SCREENING FOR LIPOID DISORDERS: ICD-10-CM

## 2024-02-03 LAB
ALBUMIN SERPL BCP-MCNC: 4.5 G/DL (ref 3.5–5)
ALP SERPL-CCNC: 81 U/L (ref 34–104)
ALT SERPL W P-5'-P-CCNC: 22 U/L (ref 7–52)
ANION GAP SERPL CALCULATED.3IONS-SCNC: 4 MMOL/L
AST SERPL W P-5'-P-CCNC: 43 U/L (ref 13–39)
BASOPHILS # BLD AUTO: 0.04 THOUSANDS/ÂΜL (ref 0–0.1)
BASOPHILS NFR BLD AUTO: 1 % (ref 0–1)
BILIRUB SERPL-MCNC: 0.45 MG/DL (ref 0.2–1)
BUN SERPL-MCNC: 15 MG/DL (ref 5–25)
CALCIUM SERPL-MCNC: 9.7 MG/DL (ref 8.4–10.2)
CHLORIDE SERPL-SCNC: 103 MMOL/L (ref 96–108)
CHOLEST SERPL-MCNC: 170 MG/DL
CO2 SERPL-SCNC: 31 MMOL/L (ref 21–32)
CREAT SERPL-MCNC: 0.96 MG/DL (ref 0.6–1.3)
EOSINOPHIL # BLD AUTO: 0.11 THOUSAND/ÂΜL (ref 0–0.61)
EOSINOPHIL NFR BLD AUTO: 1 % (ref 0–6)
ERYTHROCYTE [DISTWIDTH] IN BLOOD BY AUTOMATED COUNT: 12 % (ref 11.6–15.1)
GFR SERPL CREATININE-BSD FRML MDRD: 101 ML/MIN/1.73SQ M
GLUCOSE P FAST SERPL-MCNC: 94 MG/DL (ref 65–99)
HCT VFR BLD AUTO: 45.5 % (ref 36.5–49.3)
HDLC SERPL-MCNC: 57 MG/DL
HGB BLD-MCNC: 15.5 G/DL (ref 12–17)
IMM GRANULOCYTES # BLD AUTO: 0.02 THOUSAND/UL (ref 0–0.2)
IMM GRANULOCYTES NFR BLD AUTO: 0 % (ref 0–2)
LDLC SERPL CALC-MCNC: 103 MG/DL (ref 0–100)
LYMPHOCYTES # BLD AUTO: 1.67 THOUSANDS/ÂΜL (ref 0.6–4.47)
LYMPHOCYTES NFR BLD AUTO: 20 % (ref 14–44)
MCH RBC QN AUTO: 29.4 PG (ref 26.8–34.3)
MCHC RBC AUTO-ENTMCNC: 34.1 G/DL (ref 31.4–37.4)
MCV RBC AUTO: 86 FL (ref 82–98)
MONOCYTES # BLD AUTO: 0.94 THOUSAND/ÂΜL (ref 0.17–1.22)
MONOCYTES NFR BLD AUTO: 12 % (ref 4–12)
NEUTROPHILS # BLD AUTO: 5.4 THOUSANDS/ÂΜL (ref 1.85–7.62)
NEUTS SEG NFR BLD AUTO: 66 % (ref 43–75)
NRBC BLD AUTO-RTO: 0 /100 WBCS
PLATELET # BLD AUTO: 219 THOUSANDS/UL (ref 149–390)
PMV BLD AUTO: 9.5 FL (ref 8.9–12.7)
POTASSIUM SERPL-SCNC: 4.7 MMOL/L (ref 3.5–5.3)
PROT SERPL-MCNC: 7 G/DL (ref 6.4–8.4)
RBC # BLD AUTO: 5.28 MILLION/UL (ref 3.88–5.62)
SODIUM SERPL-SCNC: 138 MMOL/L (ref 135–147)
TRIGL SERPL-MCNC: 52 MG/DL
TSH SERPL DL<=0.05 MIU/L-ACNC: 1.55 UIU/ML (ref 0.45–4.5)
WBC # BLD AUTO: 8.18 THOUSAND/UL (ref 4.31–10.16)

## 2024-02-03 PROCEDURE — 36415 COLL VENOUS BLD VENIPUNCTURE: CPT

## 2024-02-03 PROCEDURE — 85025 COMPLETE CBC W/AUTO DIFF WBC: CPT

## 2024-02-03 PROCEDURE — 84443 ASSAY THYROID STIM HORMONE: CPT

## 2024-02-03 PROCEDURE — 80053 COMPREHEN METABOLIC PANEL: CPT

## 2024-02-03 PROCEDURE — 80061 LIPID PANEL: CPT

## 2024-02-20 ENCOUNTER — TELEPHONE (OUTPATIENT)
Dept: FAMILY MEDICINE CLINIC | Facility: CLINIC | Age: 39
End: 2024-02-20

## 2024-02-20 ENCOUNTER — OFFICE VISIT (OUTPATIENT)
Dept: FAMILY MEDICINE CLINIC | Facility: CLINIC | Age: 39
End: 2024-02-20
Payer: COMMERCIAL

## 2024-02-20 VITALS
SYSTOLIC BLOOD PRESSURE: 125 MMHG | WEIGHT: 176.2 LBS | DIASTOLIC BLOOD PRESSURE: 83 MMHG | BODY MASS INDEX: 24.57 KG/M2 | TEMPERATURE: 97.7 F

## 2024-02-20 DIAGNOSIS — Z00.00 ANNUAL PHYSICAL EXAM: Primary | ICD-10-CM

## 2024-02-20 DIAGNOSIS — R73.03 PREDIABETES: ICD-10-CM

## 2024-02-20 PROCEDURE — 99385 PREV VISIT NEW AGE 18-39: CPT | Performed by: STUDENT IN AN ORGANIZED HEALTH CARE EDUCATION/TRAINING PROGRAM

## 2024-02-20 NOTE — PROGRESS NOTES
ADULT ANNUAL PHYSICAL  [facility]    NAME: Konstantin Niño  AGE: 38 y.o. SEX: male  : 1985     DATE: 2024     Assessment and Plan:     Problem List Items Addressed This Visit     Prediabetes     -Diet and lifestyle modifications discussed  -Repeat A1c in 3 months  -Declined metformin at this time        Other Visit Diagnoses     Annual physical exam    -  Primary          Immunizations and preventive care screenings were discussed with patient today. Appropriate education was printed on patient's after visit summary.    Counseling:  Dental Health: discussed importance of regular tooth brushing, flossing, and dental visits.  Exercise: the importance of regular exercise/physical activity was discussed. Recommend exercise 3-5 times per week for at least 30 minutes.       Depression Screening and Follow-up Plan: Patient was screened for depression during today's encounter. They screened negative with a PHQ-2 score of 0.        Return in about 3 months (around 2024) for a1c check.     Chief Complaint:     No chief complaint on file.     History of Present Illness:     Adult Annual Physical   Patient here for a comprehensive physical exam. The patient reports no problems.    Diet and Physical Activity  Diet/Nutrition: frequent junk food, prolonged fasting, and limited fruits/vegetables.   Exercise: no formal exercise and works for a [employer] .      Depression Screening  PHQ-2/9 Depression Screening    Little interest or pleasure in doing things: 0 - not at all  Feeling down, depressed, or hopeless: 0 - not at all  PHQ-2 Score: 0  PHQ-2 Interpretation: Negative depression screen       General Health  Sleep: sleeps well.   Hearing: normal - bilateral.  Vision: no vision problems.   Dental: regular dental visits.            Review of Systems:     Review of Systems   Constitutional:  Negative for activity change, appetite change,  chills, fatigue and fever.   HENT:  Negative for congestion.    Respiratory:  Negative for cough, shortness of breath and wheezing.    Cardiovascular:  Negative for chest pain, palpitations and leg swelling.   Gastrointestinal:  Negative for abdominal pain, constipation, diarrhea, nausea and vomiting.   Skin:  Negative for rash.   Neurological:  Negative for light-headedness and headaches.   Psychiatric/Behavioral:  The patient is not nervous/anxious.       Past Medical History:     No past medical history on file.   Past Surgical History:     No past surgical history on file.   Social History:     Social History     Socioeconomic History   • Marital status: /Civil Union     Spouse name: None   • Number of children: None   • Years of education: None   • Highest education level: None   Occupational History   • None   Tobacco Use   • Smoking status: Never   • Smokeless tobacco: Never   Vaping Use   • Vaping status: Unknown   Substance and Sexual Activity   • Alcohol use: Never   • Drug use: Never   • Sexual activity: None   Other Topics Concern   • None   Social History Narrative   • None     Social Determinants of Health     Financial Resource Strain: Not on file   Food Insecurity: Not on file   Transportation Needs: Not on file   Physical Activity: Not on file   Stress: Not on file   Social Connections: Not on file   Intimate Partner Violence: Not on file   Housing Stability: Not on file      Family History:     Family History   Problem Relation Age of Onset   • No Known Problems Mother    • No Known Problems Father    • No Known Problems Sister    • No Known Problems Brother    • No Known Problems Maternal Aunt    • No Known Problems Maternal Uncle    • No Known Problems Paternal Aunt    • No Known Problems Paternal Uncle    • No Known Problems Maternal Grandmother    • No Known Problems Maternal Grandfather    • No Known Problems Paternal Grandmother    • No Known Problems Paternal Grandfather       Current  Medications:     Current Outpatient Medications   Medication Sig Dispense Refill   • fexofenadine (ALLEGRA) 60 MG tablet Take 60 mg by mouth daily       No current facility-administered medications for this visit.      Allergies:     No Known Allergies   Physical Exam:     /83 (BP Location: Right arm, Patient Position: Sitting)   Temp 97.7 °F (36.5 °C)   Wt 79.9 kg (176 lb 3.2 oz)   BMI 24.57 kg/m²     Physical Exam  Vitals and nursing note reviewed.   Constitutional:       General: He is not in acute distress.     Appearance: He is well-developed.   HENT:      Head: Normocephalic and atraumatic.      Right Ear: Tympanic membrane, ear canal and external ear normal.      Left Ear: Tympanic membrane, ear canal and external ear normal.      Nose: Nose normal.      Mouth/Throat:      Mouth: Mucous membranes are moist.      Pharynx: Oropharynx is clear.   Eyes:      Conjunctiva/sclera: Conjunctivae normal.   Cardiovascular:      Rate and Rhythm: Normal rate and regular rhythm.      Heart sounds: No murmur heard.  Pulmonary:      Effort: Pulmonary effort is normal. No respiratory distress.      Breath sounds: Normal breath sounds.   Abdominal:      Palpations: Abdomen is soft.      Tenderness: There is no abdominal tenderness.   Musculoskeletal:         General: No swelling.      Cervical back: Neck supple.   Skin:     General: Skin is warm and dry.      Capillary Refill: Capillary refill takes less than 2 seconds.   Neurological:      General: No focal deficit present.      Mental Status: He is alert and oriented to person, place, and time.   Psychiatric:         Mood and Affect: Mood normal.          Breanna Negro MD   [facility]

## 2024-02-20 NOTE — TELEPHONE ENCOUNTER
Konstantin was in for an appt for his physical    At check out he mentioned to Ruchi that his bw was not covered.  He was advised to call the billing office    He did.  They stated that the lab work was coded as other.      It will need to be changed for insurance to cover it.    Please advise of all the diagnosis codes and we can call lab maría to update.    CBC  CMP  TSH, 3rd generation  Lipid Panel with Direct LDL reflex  Hemoglobin A1C

## 2024-02-21 NOTE — TELEPHONE ENCOUNTER
Spoke to Jillian.  All the bw was ordered properly and was coded correctly.    Jillian wanted Konstantin to check with his insurance company to make sure that he does not have a deductible and to make sure that he is able to use [facility] since he has [employer] insurance.       He mentioned to Ruchi that his parents have the same insurance and they are able to use [facility].  I advised for him to still ask about where to get the bw done.  Maybe the plan made a change in 2024 and he is not able to use [facility] anymore.    He understood and will call back.  Advised to let the [facility] know that there is a message started in his chart.

## 2024-02-22 PROBLEM — R73.03 PREDIABETES: Status: ACTIVE | Noted: 2024-02-22

## 2024-02-22 NOTE — ASSESSMENT & PLAN NOTE
-Diet and lifestyle modifications discussed  -Repeat A1c in 3 months  -Declined metformin at this time  
There are no Wet Read(s) to document.

## 2024-02-23 NOTE — TELEPHONE ENCOUNTER
Problem: Falls - Risk of:  Goal: Will remain free from falls  Description: Will remain free from falls  3/21/2022 7817 by Gigi Montenegro RN  Outcome: Ongoing     Problem: Falls - Risk of:  Goal: Absence of physical injury  Description: Absence of physical injury  3/21/2022 0419 by Gigi Montenegro RN  Outcome: Ongoing     Problem: OXYGENATION/RESPIRATORY FUNCTION  Goal: Patient will maintain patent airway  3/21/2022 0638 by Gigi Montenegro RN  Outcome: Ongoing     Problem: OXYGENATION/RESPIRATORY FUNCTION  Goal: Patient will achieve/maintain normal respiratory rate/effort  Description: Respiratory rate and effort will be within normal limits for the patient  3/21/2022 6166 by Gigi Montenegro RN  Outcome: Ongoing     Problem: HEMODYNAMIC STATUS  Goal: Patient has stable vital signs and fluid balance  3/21/2022 0638 by Gigi Montenegro RN  Outcome: Ongoing     Problem: FLUID AND ELECTROLYTE IMBALANCE  Goal: Fluid and electrolyte balance are achieved/maintained  3/21/2022 6450 by Gigi Montenegro RN  Outcome: Ongoing     Problem: ACTIVITY INTOLERANCE/IMPAIRED MOBILITY  Goal: Mobility/activity is maintained at optimum level for patient  3/21/2022 5397 by Gigi Montenegro RN  Outcome: Ongoing Spoke to Gustabo (mom)  Konstantin was in the same room.  She stated that he called the insurance company and they told him that if he went to [employer] it would have been covered.  Gustabo stated that her bw was covered. But as we were talking, she looked at her account and she now has a bill.  She stated that they have a $1,800 deductible.  Advised to verify where they are suppose to be going.  She stated that she has used [facility] in the past.      I advised her to call her insurance company and ask for a breakdown of the bill.  Most likely her bw went towards her deductible and Konstantin's.  I also advised that she could contact [facility] and they may be able to break it down for her.

## 2024-02-23 NOTE — TELEPHONE ENCOUNTER
Spoke to Konstantin to see if he received an answer from his insurance company.  He did not have a chance to call.  He will try to call today.  Advised to let us know

## 2024-05-21 ENCOUNTER — OFFICE VISIT (OUTPATIENT)
Dept: FAMILY MEDICINE CLINIC | Facility: CLINIC | Age: 39
End: 2024-05-21
Payer: COMMERCIAL

## 2024-05-21 VITALS
OXYGEN SATURATION: 98 % | TEMPERATURE: 97.6 F | DIASTOLIC BLOOD PRESSURE: 88 MMHG | SYSTOLIC BLOOD PRESSURE: 130 MMHG | BODY MASS INDEX: 23.91 KG/M2 | HEIGHT: 71 IN | HEART RATE: 60 BPM | WEIGHT: 170.8 LBS | RESPIRATION RATE: 16 BRPM

## 2024-05-21 DIAGNOSIS — R73.03 PREDIABETES: Primary | ICD-10-CM

## 2024-05-21 PROBLEM — R03.0 ELEVATED BLOOD PRESSURE READING: Status: RESOLVED | Noted: 2024-02-02 | Resolved: 2024-05-21

## 2024-05-21 LAB — SL AMB POCT HEMOGLOBIN AIC: 5.2 (ref ?–6.5)

## 2024-05-21 PROCEDURE — 99213 OFFICE O/P EST LOW 20 MIN: CPT | Performed by: STUDENT IN AN ORGANIZED HEALTH CARE EDUCATION/TRAINING PROGRAM

## 2024-05-21 PROCEDURE — 83036 HEMOGLOBIN GLYCOSYLATED A1C: CPT | Performed by: STUDENT IN AN ORGANIZED HEALTH CARE EDUCATION/TRAINING PROGRAM

## 2024-05-21 NOTE — PROGRESS NOTES
"Ambulatory Visit  Name: Konstantin Niño      : 1985      MRN: [id number]  Encounter Provider: rBeanna Negro MD  Encounter Date: 2024   Encounter department: [facility]    Assessment & Plan   1. Prediabetes  Assessment & Plan:  -Resolved  -A1c 5.2 today from 5.8  -Continue diet and lifestyle modifications  Orders:  -     POCT hemoglobin A1c       History of Present Illness     HPI    Patient presents for A1c check. He notes he has monitored his diet these three months. He has limited sugar intake and increased consumption of fruits, vegetables and protein. No complaints today at visit.       Review of Systems   Constitutional:  Negative for activity change, appetite change, chills, fatigue and fever.   HENT:  Negative for congestion.    Respiratory:  Negative for cough, shortness of breath and wheezing.    Cardiovascular:  Negative for chest pain, palpitations and leg swelling.   Gastrointestinal:  Negative for abdominal pain, constipation, diarrhea, nausea and vomiting.   Skin:  Negative for rash.   Neurological:  Negative for light-headedness and headaches.   Psychiatric/Behavioral:  The patient is not nervous/anxious.        Objective     /88   Pulse 60   Temp 97.6 °F (36.4 °C) (Temporal)   Resp 16   Ht 5' 11\" (1.803 m)   Wt 77.5 kg (170 lb 12.8 oz)   SpO2 98%   BMI 23.82 kg/m²     Physical Exam  Constitutional:       Appearance: Normal appearance.   HENT:      Head: Normocephalic and atraumatic.   Cardiovascular:      Rate and Rhythm: Normal rate and regular rhythm.      Pulses: Normal pulses.      Heart sounds: Normal heart sounds.   Pulmonary:      Effort: Pulmonary effort is normal.      Breath sounds: Normal breath sounds.   Neurological:      General: No focal deficit present.      Mental Status: He is alert and oriented to person, place, and time.   Psychiatric:         Mood and Affect: Mood normal.         Behavior: Behavior normal.         Thought " Content: Thought content normal.         Judgment: Judgment normal.       Administrative Statements

## 2024-05-31 ENCOUNTER — APPOINTMENT (OUTPATIENT)
Dept: RADIOLOGY | Facility: CLINIC | Age: 39
End: 2024-05-31
Payer: COMMERCIAL

## 2024-05-31 ENCOUNTER — OFFICE VISIT (OUTPATIENT)
Dept: OBGYN CLINIC | Facility: CLINIC | Age: 39
End: 2024-05-31
Payer: COMMERCIAL

## 2024-05-31 VITALS
WEIGHT: 170 LBS | SYSTOLIC BLOOD PRESSURE: 125 MMHG | HEART RATE: 54 BPM | BODY MASS INDEX: 23.8 KG/M2 | DIASTOLIC BLOOD PRESSURE: 79 MMHG | HEIGHT: 71 IN

## 2024-05-31 DIAGNOSIS — M77.12 LATERAL EPICONDYLITIS OF LEFT ELBOW: Primary | ICD-10-CM

## 2024-05-31 DIAGNOSIS — M25.522 PAIN IN LEFT ELBOW: ICD-10-CM

## 2024-05-31 PROCEDURE — 73080 X-RAY EXAM OF ELBOW: CPT

## 2024-05-31 PROCEDURE — 99213 OFFICE O/P EST LOW 20 MIN: CPT | Performed by: STUDENT IN AN ORGANIZED HEALTH CARE EDUCATION/TRAINING PROGRAM

## 2024-05-31 NOTE — PROGRESS NOTES
ASSESSMENT/PLAN:    Assessment:   Lateral epicondylitis of left elbow    Plan:   Discussed the etiology of the diagnosis with the patient  I advised against corticosteroid injections  We briefly discussed surgical intervention for lateral epicondylitis.  I recommend he attend occupational hand therapy for the left elbow with a CHT, Kavon Munoz OT  We discussed ordering an MRI of the left elbow, should his pain persist after therapy  He may follow-up with Dr. Fagan for further evaluation if he has persistent symptoms       Follow Up:  3  month(s) with Dr. Fagan    To Do Next Visit:  Re-evaluation of current issue    General Discussions:  Lateral Epicondylitis: The anatomy and physiology of lateral epicondylitis was discussed with the patient today.  Typically, a traumatic injury or repetitive use may cause a partial or complete tear of the extensor carpi radialis brevis muscle.  This creates pain over the lateral epicondyle.  This pain typically is made worse with palm down lifting activities as well as anything that involves strength and stability of the wrist.  The pain may radiate from the wrist up to the elbow.  At times, the shoulder may be weak as well which can predispose or cause continuation of the problem.  Conservative treatment usually cures a majority of patients; however, this may take up to 6-9 months.  Conservative treatment options typically include activity modification, therapy for strengthening of the shoulder and elbow, nocturnal wrist support splints, tennis elbow straps, and possible corticosteroid injections.  Corticosteroid injections do not change the natural history of this process, may decrease the pain temporarily, and may increase the risk of recurrence.  Surgery is required in fewer than 10% of patients.      _____________________________________________________  CHIEF COMPLAINT:  Chief Complaint   Patient presents with   • Left Elbow - Pain         SUBJECTIVE:  Konstantin Niño  is a 38 y.o. male who presents with left elbow pain. This began about 2 years ago, most recently in the Winter. He indicates his pain is located laterally on the left elbow His pain does not radiate to the fingers. His pain disrupts his sleep at night.  He has tried counter force bracing with some relief. He has had three corticosteroid injections of the left elbow in the past with Dr. Franklin. He states the injections typically last him one year. He has not tried taking any over the counter pain medications at this time.    Previous Treatments: steroid injections with relief  Handedness: right  Work status: full-time    PAST MEDICAL HISTORY:  History reviewed. No pertinent past medical history.    PAST SURGICAL HISTORY:  History reviewed. No pertinent surgical history.    FAMILY HISTORY:  Family History   Problem Relation Age of Onset   • No Known Problems Mother    • No Known Problems Father    • No Known Problems Sister    • No Known Problems Brother    • No Known Problems Maternal Aunt    • No Known Problems Maternal Uncle    • No Known Problems Paternal Aunt    • No Known Problems Paternal Uncle    • No Known Problems Maternal Grandmother    • No Known Problems Maternal Grandfather    • No Known Problems Paternal Grandmother    • No Known Problems Paternal Grandfather        SOCIAL HISTORY:  Social History     Tobacco Use   • Smoking status: Never     Passive exposure: Never   • Smokeless tobacco: Never   Vaping Use   • Vaping status: Never Used   Substance Use Topics   • Alcohol use: Never   • Drug use: Never       MEDICATIONS:    Current Outpatient Medications:   •  fexofenadine (ALLEGRA) 60 MG tablet, Take 60 mg by mouth daily, Disp: , Rfl:     ALLERGIES:  No Known Allergies    REVIEW OF SYSTEMS:  Pertinent items are noted in HPI.  A comprehensive review of systems was negative.    LABS:  HgA1c:   Lab Results   Component Value Date    HGBA1C 5.2 05/21/2024     BMP:   Lab Results   Component Value Date     "CALCIUM 9.7 02/03/2024    K 4.7 02/03/2024    CO2 31 02/03/2024     02/03/2024    BUN 15 02/03/2024    CREATININE 0.96 02/03/2024         _____________________________________________________  PHYSICAL EXAMINATION:  Vital signs: /79   Pulse (!) 54   Ht 5' 11\" (1.803 m)   Wt 77.1 kg (170 lb)   BMI 23.71 kg/m²   General: well developed and well nourished, alert, oriented times 3, and appears comfortable  Psychiatric: Normal  HEENT: Trachea Midline, No torticollis  Cardiovascular: No discernable arrhythmia  Pulmonary: No wheezing or stridor  Abdomen: No rebound or guarding  Extremities: No peripheral edema  Skin: No masses, erythema, lacerations, fluctation, ulcerations  Neurovascular: Sensation Intact to the Median, Ulnar, Radial Nerve, Motor Intact to the Median, Ulnar, Radial Nerve, and Pulses Intact    MUSCULOSKELETAL EXAMINATION:  LEFT SIDE:  Epicondylitis: Positive tenderness Laterally and Positive pain on resisted wrist extension    _____________________________________________________  STUDIES REVIEWED:  Images were reviewed in PACS by Dr. Roe and demonstrate: no acute osseous abnormalities or significant degenerative changes      PROCEDURES PERFORMED:  Procedures  No Procedures performed today    Scribe Attestation    I,:  Shelley Bennett am acting as a scribe while in the presence of the attending physician.:       I,:  Carlee Roe MD personally performed the services described in this documentation    as scribed in my presence.:         "

## 2024-06-13 ENCOUNTER — OFFICE VISIT (OUTPATIENT)
Dept: FAMILY MEDICINE CLINIC | Facility: CLINIC | Age: 39
End: 2024-06-13
Payer: COMMERCIAL

## 2024-06-13 ENCOUNTER — TELEPHONE (OUTPATIENT)
Age: 39
End: 2024-06-13

## 2024-06-13 VITALS
SYSTOLIC BLOOD PRESSURE: 134 MMHG | TEMPERATURE: 97.7 F | OXYGEN SATURATION: 99 % | RESPIRATION RATE: 16 BRPM | BODY MASS INDEX: 23.72 KG/M2 | HEIGHT: 71 IN | DIASTOLIC BLOOD PRESSURE: 70 MMHG | HEART RATE: 56 BPM | WEIGHT: 169.4 LBS

## 2024-06-13 DIAGNOSIS — B02.9 HERPES ZOSTER WITHOUT COMPLICATION: ICD-10-CM

## 2024-06-13 DIAGNOSIS — R21 RASH: Primary | ICD-10-CM

## 2024-06-13 PROCEDURE — 99213 OFFICE O/P EST LOW 20 MIN: CPT | Performed by: FAMILY MEDICINE

## 2024-06-13 RX ORDER — VALACYCLOVIR HYDROCHLORIDE 1 G/1
1000 TABLET, FILM COATED ORAL 3 TIMES DAILY
Qty: 21 TABLET | Refills: 0 | Status: SHIPPED | OUTPATIENT
Start: 2024-06-13 | End: 2024-06-20

## 2024-06-13 NOTE — PROGRESS NOTES
"Ambulatory Visit  Name: Konstantin Niño      : 1985      MRN: [id number]  Encounter Provider: Syed Matthews MD  Encounter Date: 2024   Encounter department: [facility]    Assessment & Plan   1. Rash  2. Herpes zoster without complication       History of Present Illness     RASH X 3 DAYS  PAINFUL  HX OF SHINGLES 10-15 YRS AGO  NO FEVER        Review of Systems   Constitutional:  Negative for chills, fatigue and fever.   HENT:  Negative for sore throat.    Eyes:  Negative for discharge.   Respiratory:  Negative for cough and chest tightness.    Cardiovascular:  Negative for chest pain and palpitations.   Gastrointestinal:  Negative for abdominal pain, diarrhea, nausea and vomiting.   Musculoskeletal:  Negative for arthralgias and gait problem.   Skin:  Positive for rash.   Neurological:  Negative for dizziness, weakness and headaches.   Hematological:  Negative for adenopathy.   Psychiatric/Behavioral:  The patient is not nervous/anxious.        Objective     /70   Pulse 56   Temp 97.7 °F (36.5 °C) (Temporal)   Resp 16   Ht 5' 11\" (1.803 m)   Wt 76.8 kg (169 lb 6.4 oz)   SpO2 99%   BMI 23.63 kg/m²     Physical Exam    RASH CONSISTENT WITH HERPES ZOSTER  Administrative Statements     "

## 2024-06-13 NOTE — TELEPHONE ENCOUNTER
The patient called he stopped at the pharmacy and they did not have the medication script  yet    the medication was for his  shingles   he would like it sent to the [facility] pharmacy  thank you

## 2025-01-07 ENCOUNTER — OFFICE VISIT (OUTPATIENT)
Dept: FAMILY MEDICINE CLINIC | Facility: CLINIC | Age: 40
End: 2025-01-07
Payer: COMMERCIAL

## 2025-01-07 VITALS
RESPIRATION RATE: 18 BRPM | DIASTOLIC BLOOD PRESSURE: 78 MMHG | HEIGHT: 71 IN | TEMPERATURE: 97.3 F | HEART RATE: 77 BPM | BODY MASS INDEX: 24.5 KG/M2 | SYSTOLIC BLOOD PRESSURE: 118 MMHG | OXYGEN SATURATION: 97 % | WEIGHT: 175 LBS

## 2025-01-07 DIAGNOSIS — J32.9 RECURRENT SINUS INFECTIONS: Primary | ICD-10-CM

## 2025-01-07 DIAGNOSIS — R10.11 RIGHT UPPER QUADRANT PAIN: ICD-10-CM

## 2025-01-07 DIAGNOSIS — R09.81 NASAL CONGESTION: ICD-10-CM

## 2025-01-07 PROCEDURE — 99213 OFFICE O/P EST LOW 20 MIN: CPT | Performed by: STUDENT IN AN ORGANIZED HEALTH CARE EDUCATION/TRAINING PROGRAM

## 2025-01-07 RX ORDER — METHYLPREDNISOLONE 4 MG/1
TABLET ORAL
Qty: 21 EACH | Refills: 0 | Status: SHIPPED | OUTPATIENT
Start: 2025-01-07

## 2025-01-07 NOTE — PROGRESS NOTES
"Name: Konstantin Niño      : 1985      MRN: [id number]  Encounter Provider: Breanna Negro MD  Encounter Date: 2025   Encounter department: [facility]  :  Assessment & Plan  Recurrent sinus infections    Orders:  •  amoxicillin-clavulanate (AUGMENTIN) 875-125 mg per tablet; Take 1 tablet by mouth every 12 (twelve) hours for 7 days    Right upper quadrant pain    Orders:  •  US right upper quadrant; Future  •  methylPREDNISolone 4 MG tablet therapy pack; Use as directed on package    Nasal congestion    Orders:  •  methylPREDNISolone 4 MG tablet therapy pack; Use as directed on package           History of Present Illness     HPI    Patient went to urgent care on  and was given antibioitcs, he does not remember which one he took. He notes symptoms have improved but then returned about five days ago. He does have nasal congestion and chest congestion. No ear pain. No fever or chills. Appetite is intact. His whole family is sick. He has been taking mucinex D and nasicort which helped slightly. Slight cough. He notes RUQ pain that started about a week ago as well. No trauma to this area. No foods make it worse.     Review of Systems   Constitutional:  Negative for activity change, appetite change, chills, fatigue and fever.   HENT:  Positive for congestion, postnasal drip and rhinorrhea.    Respiratory:  Negative for cough, shortness of breath and wheezing.    Cardiovascular:  Negative for chest pain, palpitations and leg swelling.   Gastrointestinal:  Positive for abdominal pain. Negative for constipation, diarrhea, nausea and vomiting.   Skin:  Negative for rash.   Neurological:  Negative for light-headedness and headaches.   Psychiatric/Behavioral:  The patient is not nervous/anxious.        Objective   /78 (BP Location: Left arm, Patient Position: Sitting, Cuff Size: Large)   Pulse 77   Temp (!) 97.3 °F (36.3 °C) (Temporal)   Resp 18   Ht 5' 11\" (1.803 m)   Wt " 79.4 kg (175 lb)   SpO2 97%   BMI 24.41 kg/m²      Physical Exam  Constitutional:       Appearance: Normal appearance.   HENT:      Head: Normocephalic and atraumatic.      Nose: Congestion and rhinorrhea present.   Cardiovascular:      Rate and Rhythm: Normal rate and regular rhythm.      Pulses: Normal pulses.      Heart sounds: Normal heart sounds.   Pulmonary:      Effort: Pulmonary effort is normal.      Breath sounds: Normal breath sounds.   Abdominal:      Tenderness: There is abdominal tenderness in the right upper quadrant.   Neurological:      General: No focal deficit present.      Mental Status: He is alert and oriented to person, place, and time.   Psychiatric:         Mood and Affect: Mood normal.         Behavior: Behavior normal.         Thought Content: Thought content normal.         Judgment: Judgment normal.

## 2025-01-13 ENCOUNTER — HOSPITAL ENCOUNTER (OUTPATIENT)
Dept: RADIOLOGY | Facility: HOSPITAL | Age: 40
Discharge: HOME | End: 2025-01-13
Attending: STUDENT IN AN ORGANIZED HEALTH CARE EDUCATION/TRAINING PROGRAM
Payer: COMMERCIAL

## 2025-01-13 DIAGNOSIS — R10.11 RIGHT UPPER QUADRANT PAIN: ICD-10-CM

## 2025-01-13 PROCEDURE — 76705 ECHO EXAM OF ABDOMEN: CPT

## 2025-01-16 ENCOUNTER — RESULTS FOLLOW-UP (OUTPATIENT)
Dept: FAMILY MEDICINE CLINIC | Facility: CLINIC | Age: 40
End: 2025-01-16

## 2025-01-16 DIAGNOSIS — R03.0 ELEVATED BLOOD PRESSURE READING: ICD-10-CM

## 2025-01-16 DIAGNOSIS — Z13.29 SCREENING FOR THYROID DISORDER: ICD-10-CM

## 2025-01-16 DIAGNOSIS — Z13.228 SCREENING FOR METABOLIC DISORDER: ICD-10-CM

## 2025-01-16 DIAGNOSIS — Z00.00 ANNUAL PHYSICAL EXAM: Primary | ICD-10-CM

## 2025-01-16 DIAGNOSIS — Z11.4 SCREENING FOR HIV WITHOUT PRESENCE OF RISK FACTORS: ICD-10-CM

## 2025-01-16 DIAGNOSIS — Z11.59 ENCOUNTER FOR HEPATITIS C SCREENING TEST FOR LOW RISK PATIENT: ICD-10-CM

## 2025-01-16 DIAGNOSIS — Z13.220 SCREENING FOR HYPERLIPIDEMIA: ICD-10-CM

## 2025-02-08 LAB
BASOPHILS # BLD AUTO: 0 X10E3/UL (ref 0–0.2)
BASOPHILS NFR BLD AUTO: 1 %
EOSINOPHIL # BLD AUTO: 0.1 X10E3/UL (ref 0–0.4)
EOSINOPHIL NFR BLD AUTO: 2 %
ERYTHROCYTE [DISTWIDTH] IN BLOOD BY AUTOMATED COUNT: 12.6 % (ref 11.6–15.4)
HCT VFR BLD AUTO: 43.5 % (ref 37.5–51)
HGB BLD-MCNC: 14.6 G/DL (ref 13–17.7)
IMM GRANULOCYTES # BLD: 0 X10E3/UL (ref 0–0.1)
IMM GRANULOCYTES NFR BLD: 0 %
LYMPHOCYTES # BLD AUTO: 2.1 X10E3/UL (ref 0.7–3.1)
LYMPHOCYTES NFR BLD AUTO: 50 %
MCH RBC QN AUTO: 29 PG (ref 26.6–33)
MCHC RBC AUTO-ENTMCNC: 33.6 G/DL (ref 31.5–35.7)
MCV RBC AUTO: 87 FL (ref 79–97)
MONOCYTES # BLD AUTO: 0.4 X10E3/UL (ref 0.1–0.9)
MONOCYTES NFR BLD AUTO: 9 %
NEUTROPHILS # BLD AUTO: 1.6 X10E3/UL (ref 1.4–7)
NEUTROPHILS NFR BLD AUTO: 38 %
PLATELET # BLD AUTO: 219 X10E3/UL (ref 150–450)
RBC # BLD AUTO: 5.03 X10E6/UL (ref 4.14–5.8)
WBC # BLD AUTO: 4.2 X10E3/UL (ref 3.4–10.8)

## 2025-02-09 LAB
ALBUMIN SERPL-MCNC: 4.8 G/DL (ref 4.1–5.1)
ALP SERPL-CCNC: 96 IU/L (ref 44–121)
ALT SERPL-CCNC: 15 IU/L (ref 0–44)
AST SERPL-CCNC: 56 IU/L (ref 0–40)
BILIRUB SERPL-MCNC: 0.3 MG/DL (ref 0–1.2)
BUN SERPL-MCNC: 15 MG/DL (ref 6–20)
BUN/CREAT SERPL: 17 (ref 9–20)
CALCIUM SERPL-MCNC: 9.7 MG/DL (ref 8.7–10.2)
CHLORIDE SERPL-SCNC: 100 MMOL/L (ref 96–106)
CHOLEST SERPL-MCNC: 191 MG/DL (ref 100–199)
CO2 SERPL-SCNC: 25 MMOL/L (ref 20–29)
CREAT SERPL-MCNC: 0.87 MG/DL (ref 0.76–1.27)
EGFR: 114 ML/MIN/1.73
GLOBULIN SER-MCNC: 2.1 G/DL (ref 1.5–4.5)
GLUCOSE SERPL-MCNC: 96 MG/DL (ref 70–99)
HCV AB S/CO SERPL IA: NON REACTIVE
HDLC SERPL-MCNC: 64 MG/DL
HIV 1+2 AB+HIV1 P24 AG SERPL QL IA: NON REACTIVE
LDLC SERPL CALC-MCNC: 117 MG/DL (ref 0–99)
POTASSIUM SERPL-SCNC: 4.8 MMOL/L (ref 3.5–5.2)
PROT SERPL-MCNC: 6.9 G/DL (ref 6–8.5)
SL AMB VLDL CHOLESTEROL CALC: 10 MG/DL (ref 5–40)
SODIUM SERPL-SCNC: 139 MMOL/L (ref 134–144)
TRIGL SERPL-MCNC: 53 MG/DL (ref 0–149)
TSH SERPL DL<=0.005 MIU/L-ACNC: 1.12 UIU/ML (ref 0.45–4.5)

## 2025-02-24 ENCOUNTER — OFFICE VISIT (OUTPATIENT)
Dept: FAMILY MEDICINE CLINIC | Facility: CLINIC | Age: 40
End: 2025-02-24
Payer: COMMERCIAL

## 2025-02-24 VITALS
BODY MASS INDEX: 24.62 KG/M2 | DIASTOLIC BLOOD PRESSURE: 82 MMHG | RESPIRATION RATE: 16 BRPM | TEMPERATURE: 98 F | SYSTOLIC BLOOD PRESSURE: 116 MMHG | OXYGEN SATURATION: 98 % | HEIGHT: 70 IN | WEIGHT: 172 LBS | HEART RATE: 78 BPM

## 2025-02-24 DIAGNOSIS — R74.01 ELEVATED AST (SGOT): ICD-10-CM

## 2025-02-24 DIAGNOSIS — Z00.00 ANNUAL PHYSICAL EXAM: Primary | ICD-10-CM

## 2025-02-24 PROCEDURE — 99395 PREV VISIT EST AGE 18-39: CPT | Performed by: STUDENT IN AN ORGANIZED HEALTH CARE EDUCATION/TRAINING PROGRAM

## 2025-02-24 NOTE — PROGRESS NOTES
Adult Annual Physical  Name: Konstantin Niño      : 1985      MRN: [id number]  Encounter Provider: Breanna Negro MD  Encounter Date: 2025   Encounter department: [facility]    Assessment & Plan  Annual physical exam         Elevated AST (SGOT)    Orders:  •  Lipid Panel with Direct LDL reflex; Future  •  Comprehensive metabolic panel; Future    Immunizations and preventive care screenings were discussed with patient today. Appropriate education was printed on patient's after visit summary.  Patient reports he will follow up with when last tdap immunization was done and will follow up if needed      Counseling:  Alcohol/drug use: discussed moderation in alcohol intake, the recommendations for healthy alcohol use  Dental Health: discussed importance of regular tooth brushing, flossing, and dental visits.  Exercise: the importance of regular exercise/physical activity was discussed. Recommend exercise 3-5 times per week for at least 30 minutes.       Depression Screening and Follow-up Plan: Patient was screened for depression during today's encounter. They screened negative with a PHQ-2 score of 0.          History of Present Illness     Adult Annual Physical:  Patient presents for annual physical.     Diet and Physical Activity:  - Diet/Nutrition: well balanced diet and limited junk food.  - Exercise: no formal exercise. job is physically demanding    Depression Screening:  - PHQ-2 Score: 0    General Health:  - Sleep: sleeps well and 7-8 hours of sleep on average.  - Hearing: normal hearing bilateral ears.  - Vision: no vision problems and most recent eye exam < 1 year ago.  - Dental: regular dental visits and brushes teeth once daily.     Health:  - History of STDs: no.   - Urinary symptoms: none.     Review of Systems   Constitutional:  Negative for activity change, appetite change, chills, fatigue and fever.   HENT:  Negative for congestion.    Respiratory:  Negative for  "cough, shortness of breath and wheezing.    Cardiovascular:  Negative for chest pain, palpitations and leg swelling.   Gastrointestinal:  Negative for abdominal pain, constipation, diarrhea, nausea and vomiting.   Skin:  Negative for rash.   Neurological:  Negative for light-headedness and headaches.   Psychiatric/Behavioral:  The patient is not nervous/anxious.          Objective   /82 (BP Location: Left arm, Patient Position: Sitting, Cuff Size: Large)   Pulse 78   Temp 98 °F (36.7 °C) (Temporal)   Resp 16   Ht 5' 10\" (1.778 m)   Wt 78 kg (172 lb)   SpO2 98%   BMI 24.68 kg/m²     Physical Exam  Vitals and nursing note reviewed.   Constitutional:       General: He is not in acute distress.     Appearance: He is well-developed.   HENT:      Head: Normocephalic and atraumatic.   Eyes:      Conjunctiva/sclera: Conjunctivae normal.   Cardiovascular:      Rate and Rhythm: Normal rate and regular rhythm.      Heart sounds: No murmur heard.  Pulmonary:      Effort: Pulmonary effort is normal. No respiratory distress.      Breath sounds: Normal breath sounds.   Abdominal:      Palpations: Abdomen is soft.      Tenderness: There is no abdominal tenderness.   Musculoskeletal:         General: No swelling.      Cervical back: Neck supple.   Skin:     General: Skin is warm and dry.      Capillary Refill: Capillary refill takes less than 2 seconds.   Neurological:      Mental Status: He is alert.   Psychiatric:         Mood and Affect: Mood normal.         "

## 2025-05-19 ENCOUNTER — RA CDI HCC (OUTPATIENT)
Dept: OTHER | Facility: HOSPITAL | Age: 40
End: 2025-05-19

## 2025-05-19 NOTE — PROGRESS NOTES
HCC coding opportunities       Chart reviewed, no opportunity found: CHART REVIEWED, NO OPPORTUNITY FOUND        Patients Insurance        Commercial Insurance: [employer]